# Patient Record
Sex: FEMALE | Race: WHITE | NOT HISPANIC OR LATINO | Employment: UNEMPLOYED | URBAN - METROPOLITAN AREA
[De-identification: names, ages, dates, MRNs, and addresses within clinical notes are randomized per-mention and may not be internally consistent; named-entity substitution may affect disease eponyms.]

---

## 2018-01-02 ENCOUNTER — OFFICE VISIT (OUTPATIENT)
Dept: MEDICAL GROUP | Facility: MEDICAL CENTER | Age: 68
End: 2018-01-02
Payer: MEDICARE

## 2018-01-02 VITALS
HEART RATE: 80 BPM | OXYGEN SATURATION: 94 % | SYSTOLIC BLOOD PRESSURE: 132 MMHG | WEIGHT: 154 LBS | RESPIRATION RATE: 16 BRPM | DIASTOLIC BLOOD PRESSURE: 80 MMHG | TEMPERATURE: 97.8 F | BODY MASS INDEX: 28.34 KG/M2 | HEIGHT: 62 IN

## 2018-01-02 DIAGNOSIS — E78.2 MIXED HYPERLIPIDEMIA: ICD-10-CM

## 2018-01-02 DIAGNOSIS — G47.9 SLEEP DISORDER: ICD-10-CM

## 2018-01-02 DIAGNOSIS — Z12.39 SCREENING FOR MALIGNANT NEOPLASM OF BREAST: ICD-10-CM

## 2018-01-02 DIAGNOSIS — Z23 NEED FOR VACCINATION: ICD-10-CM

## 2018-01-02 DIAGNOSIS — Z12.11 SCREEN FOR COLON CANCER: ICD-10-CM

## 2018-01-02 DIAGNOSIS — I10 ESSENTIAL HYPERTENSION: ICD-10-CM

## 2018-01-02 PROBLEM — Z98.84 HX OF LAPAROSCOPIC GASTRIC BANDING: Status: ACTIVE | Noted: 2018-01-02

## 2018-01-02 PROBLEM — F17.210 CIGARETTE NICOTINE DEPENDENCE WITHOUT COMPLICATION: Status: ACTIVE | Noted: 2018-01-02

## 2018-01-02 PROCEDURE — 99214 OFFICE O/P EST MOD 30 MIN: CPT | Performed by: NURSE PRACTITIONER

## 2018-01-02 RX ORDER — AMITRIPTYLINE HYDROCHLORIDE 150 MG/1
TABLET ORAL
Qty: 180 TAB | Refills: 3 | Status: SHIPPED | OUTPATIENT
Start: 2018-01-02 | End: 2018-09-26 | Stop reason: SDUPTHER

## 2018-01-02 RX ORDER — LOSARTAN POTASSIUM 100 MG/1
100 TABLET ORAL DAILY
Qty: 90 TAB | Refills: 3 | Status: SHIPPED | OUTPATIENT
Start: 2018-01-02 | End: 2018-09-26 | Stop reason: SDUPTHER

## 2018-01-02 ASSESSMENT — PATIENT HEALTH QUESTIONNAIRE - PHQ9: CLINICAL INTERPRETATION OF PHQ2 SCORE: 0

## 2018-01-02 NOTE — PROGRESS NOTES
CC: Medication refill    Vivian Worrell is a 67 y.o. female here to establish care and to discuss the evaluation and management of:    1. High blood pressure  Patient states that she has high blood pressure and she takes losartan for this on a daily basis. Does not take her blood pressure at home. Denies any dizziness, chest pain, headache, or leg swelling.    2. High cholesterol  Patient states that she does not take atorvastatin anymore for this and has not taken it for over one year. Does not remember when and why she came off that medication. States she's lost some weight. She states she doesn't eat that much anymore. Has not had her labs checked in over a year.    3. Insomnia  Patient states that she suffers from insomnia.Taking amitriptyline for years for this. States that she sometimes takes one to 2 tablets as needed for sleep. Patient states that she works at [employer] on the night shift so her sleep pattern is disrupted.    4. Dry skin under finger nails  Patient states that she is going to be following up with a dermatologist states that she's had some dry skin growing underneath her fingernails for quite some time now. States that she has tried ointments and lotion that are over-the-counter. States that her niece has given her some black pepper oil to try it and seems to be a little bit better. States that symptoms he can peel her skin back underneath her fingernails, especially on her right hand.    ROS:  Denies any Headache, Blurred Vision, Confusion Chest pain,  Shortness of breath,  Abdominal pain, Changes of bowel or bladder, Lower ext edema, Fevers, Nights sweats, Weight Changes, Focal weakness or numbness.  All other systems are negative.      Current Outpatient Prescriptions:   •  amitriptyline (ELAVIL) 150 MG Tab, May take one to two tablets at night as needed for sleep, Disp: 180 Tab, Rfl: 3  •  losartan (COZAAR) 100 MG Tab, Take 1 Tab by mouth every day., Disp: 90 Tab, Rfl: 3    Allergies  "  Allergen Reactions   • Morphine        Past Medical History:   Diagnosis Date   • Hypertension 2004   • Obesity    • Sleep disorder      Past Surgical History:   Procedure Laterality Date   • HYSTERECTOMY, VAGINAL  2006   • CYSTOCELE REPAIR  2006    bladder suspension   • GASTRIC BANDING LAPAROSCOPIC  2004   • CHOLECYSTECTOMY  1993   • ELBOW ORIF Left 199     Family History   Problem Relation Age of Onset   • Heart Disease Mother    • Heart Disease Brother 60   • Diabetes Maternal Grandmother    • Stroke Paternal Grandmother    • Cancer Neg Hx      Social History     Social History   • Marital status: Unknown     Spouse name: N/A   • Number of children: N/A   • Years of education: N/A     Occupational History   • [employer] -      Social History Main Topics   • Smoking status: Light Tobacco Smoker     Packs/day: 1.00     Years: 35.00     Last attempt to quit: 9/28/2015   • Smokeless tobacco: Never Used      Comment: started again about 3 weeks ago   • Alcohol use No   • Drug use: No   • Sexual activity: Not Currently      Comment: , not working     Other Topics Concern   • Not on file     Social History Narrative   • No narrative on file       Objective:     Vitals: /80   Pulse 80   Temp 36.6 °C (97.8 °F)   Resp 16   Ht 1.575 m (5' 2\")   Wt 69.9 kg (154 lb)   SpO2 94%   BMI 28.17 kg/m²      General: Alert, pleasant, NAD  HEENT:  Normocephalic.   Neck supple.  No thyromegaly or masses palpated. No cervical or supraclavicular lymphadenopathy.  Heart:  Regular rate and rhythm.  S1 and S2 normal.  No murmurs appreciated.  Respiratory:  Normal respiratory effort.  Clear to auscultation bilaterally.    Skin:  Warm, dry, no rashes, on right hand 1st and 2nd metacarpal underneath nails at the tip of the finger present with dry hypertrophic skin, no erythema, no signs of infection.  Musculoskeletal:  Gait is normal.  Moves all extremities well.  Extremities:   No leg edema.  Neurological: " No tremors, sensation grossly intact  Psych:  Affect/mood is normal, judgement is good, memory is intact, grooming is appropriate.      Assessment and Plan.   67 y.o. female to establish and discuss the following    1. Essential hypertension  Staple in clinic today 130/80. Denies any chest pain, shortness of breath, dizziness and her leg swelling. Continue taking losartan daily. Patient has reduced her weight over the last year, continue exercise and heart healthy diet.  - BASIC METABOLIC PANEL; Future  - losartan (COZAAR) 100 MG Tab; Take 1 Tab by mouth every day.  Dispense: 90 Tab; Refill: 3  - TSH WITH REFLEX TO FT4; Future    2. Mixed hyperlipidemia  Needs updated lipid panel as she has not been on statin medication for over one year.  - LIPID PROFILE; Future    3. Sleep disorder  Stable. Continue using amitriptyline cautioned against driving, drinking alcoholic beverages and long-term use of medication.  - amitriptyline (ELAVIL) 150 MG Tab; May take one to two tablets at night as needed for sleep  Dispense: 180 Tab; Refill: 3    4. Screening for malignant neoplasm of breast  Has not had a mammogram, denies any nipple discharge, swelling, redness in her breasts were lumps or any masses she's concerned about.  - MA-MAMMO SCREENING BILAT W/LESTER W/CAD; Future    5. Screen for colon cancer  Has not had a colonoscopy states that a fit test several years ago. No family history of colon cancer no changes in presentation of her bowels.  - OCCULT BLOOD FECES IMMUNOASSAY (FIT); Future    6. Need for vaccination  We don't have vaccine at this clinic patient is to attempt to receive vaccine at another unknown facility this week.        Health Maintenance: possible fungal infection underneath nail beds, patient states is going to see a dermatologist for this.    Return in about 3 months (around 4/2/2018) for Blood Pressure.          Roxann BOWERS

## 2018-09-15 ENCOUNTER — HOSPITAL ENCOUNTER (OUTPATIENT)
Facility: MEDICAL CENTER | Age: 68
End: 2018-09-15
Attending: NURSE PRACTITIONER
Payer: MEDICARE

## 2018-09-15 ENCOUNTER — OFFICE VISIT (OUTPATIENT)
Dept: URGENT CARE | Facility: PHYSICIAN GROUP | Age: 68
End: 2018-09-15
Payer: MEDICARE

## 2018-09-15 VITALS
WEIGHT: 164 LBS | SYSTOLIC BLOOD PRESSURE: 122 MMHG | BODY MASS INDEX: 30.18 KG/M2 | RESPIRATION RATE: 18 BRPM | HEIGHT: 62 IN | HEART RATE: 84 BPM | DIASTOLIC BLOOD PRESSURE: 78 MMHG | TEMPERATURE: 98.6 F | OXYGEN SATURATION: 97 %

## 2018-09-15 DIAGNOSIS — R30.0 DYSURIA: ICD-10-CM

## 2018-09-15 DIAGNOSIS — N30.01 ACUTE CYSTITIS WITH HEMATURIA: ICD-10-CM

## 2018-09-15 LAB
APPEARANCE UR: CLEAR
BILIRUB UR STRIP-MCNC: NEGATIVE MG/DL
COLOR UR AUTO: YELLOW
GLUCOSE UR STRIP.AUTO-MCNC: NEGATIVE MG/DL
KETONES UR STRIP.AUTO-MCNC: NEGATIVE MG/DL
LEUKOCYTE ESTERASE UR QL STRIP.AUTO: NORMAL
NITRITE UR QL STRIP.AUTO: NEGATIVE
PH UR STRIP.AUTO: 7 [PH] (ref 5–8)
PROT UR QL STRIP: NEGATIVE MG/DL
RBC UR QL AUTO: NORMAL
SP GR UR STRIP.AUTO: 1.01
UROBILINOGEN UR STRIP-MCNC: 0.2 MG/DL

## 2018-09-15 PROCEDURE — 87086 URINE CULTURE/COLONY COUNT: CPT

## 2018-09-15 PROCEDURE — 87186 SC STD MICRODIL/AGAR DIL: CPT

## 2018-09-15 PROCEDURE — 81002 URINALYSIS NONAUTO W/O SCOPE: CPT | Performed by: NURSE PRACTITIONER

## 2018-09-15 PROCEDURE — 99214 OFFICE O/P EST MOD 30 MIN: CPT | Performed by: NURSE PRACTITIONER

## 2018-09-15 PROCEDURE — 87077 CULTURE AEROBIC IDENTIFY: CPT

## 2018-09-15 RX ORDER — PHENAZOPYRIDINE HYDROCHLORIDE 200 MG/1
200 TABLET, FILM COATED ORAL 3 TIMES DAILY PRN
Qty: 6 TAB | Refills: 0 | Status: SHIPPED | OUTPATIENT
Start: 2018-09-15 | End: 2019-04-28

## 2018-09-15 RX ORDER — NITROFURANTOIN 25; 75 MG/1; MG/1
100 CAPSULE ORAL EVERY 12 HOURS
Qty: 10 CAP | Refills: 0 | Status: SHIPPED | OUTPATIENT
Start: 2018-09-15 | End: 2018-09-20

## 2018-09-15 ASSESSMENT — ENCOUNTER SYMPTOMS
FEVER: 0
SWEATS: 0
HEADACHES: 0
FLANK PAIN: 0
VOMITING: 0
NAUSEA: 0
ABDOMINAL PAIN: 0
DIARRHEA: 0
BACK PAIN: 0
CONSTIPATION: 0
CHILLS: 0

## 2018-09-15 ASSESSMENT — LIFESTYLE VARIABLES: SUBSTANCE_ABUSE: 0

## 2018-09-15 NOTE — PATIENT INSTRUCTIONS
Urinary Tract Infection, Adult  Introduction  A urinary tract infection (UTI) is an infection of any part of the urinary tract. The urinary tract includes the:  · Kidneys.  · Ureters.  · Bladder.  · Urethra.  These organs make, store, and get rid of pee (urine) in the body.  Follow these instructions at home:  · Take over-the-counter and prescription medicines only as told by your doctor.  · If you were prescribed an antibiotic medicine, take it as told by your doctor. Do not stop taking the antibiotic even if you start to feel better.  · Avoid the following drinks:  ¨ Alcohol.  ¨ Caffeine.  ¨ Tea.  ¨ Carbonated drinks.  · Drink enough fluid to keep your pee clear or pale yellow.  · Keep all follow-up visits as told by your doctor. This is important.  · Make sure to:  ¨ Empty your bladder often and completely. Do not to hold pee for long periods of time.  ¨ Empty your bladder before and after sex.  ¨ Wipe from front to back after a bowel movement if you are female. Use each tissue one time when you wipe.  Contact a doctor if:  · You have back pain.  · You have a fever.  · You feel sick to your stomach (nauseous).  · You throw up (vomit).  · Your symptoms do not get better after 3 days.  · Your symptoms go away and then come back.  Get help right away if:  · You have very bad back pain.  · You have very bad lower belly (abdominal) pain.  · You are throwing up and cannot keep down any medicines or water.  This information is not intended to replace advice given to you by your health care provider. Make sure you discuss any questions you have with your health care provider.  Document Released: 06/05/2009 Document Revised: 05/25/2017 Document Reviewed: 11/07/2016  © 2017 [employer]

## 2018-09-15 NOTE — PROGRESS NOTES
"Subjective:      Vivian Worrell is a 67 y.o. female who presents with Painful Urination (urgency, frequency, X last night )            Dysuria    This is a new problem. The current episode started yesterday. The problem occurs every urination. The problem has been gradually worsening. The quality of the pain is described as aching and burning. The pain is at a severity of 4/10. The pain is moderate. There has been no fever. She is not sexually active. There is no history of pyelonephritis. Associated symptoms include hesitancy. Pertinent negatives include no chills, discharge, flank pain, nausea, sweats or vomiting. She has tried increased fluids for the symptoms. The treatment provided mild relief. There is no history of catheterization, kidney stones, recurrent UTIs, a single kidney, urinary stasis or a urological procedure.       Review of Systems   Constitutional: Negative for chills and fever.   Gastrointestinal: Negative for abdominal pain, constipation, diarrhea, nausea and vomiting.   Genitourinary: Positive for hesitancy. Negative for flank pain.   Musculoskeletal: Negative for back pain.   Neurological: Negative for headaches.   Psychiatric/Behavioral: Negative for substance abuse.          Objective:     /78   Pulse 84   Temp 37 °C (98.6 °F)   Resp 18   Ht 1.575 m (5' 2\")   Wt 74.4 kg (164 lb)   SpO2 97%   BMI 30.00 kg/m²      Physical Exam   Constitutional: Vital signs are normal. She appears well-developed and well-nourished. She is cooperative.  Non-toxic appearance. She does not appear ill. No distress.   HENT:   Head: Normocephalic.   Cardiovascular: Normal rate and regular rhythm.    Pulmonary/Chest: Effort normal and breath sounds normal.   Abdominal: Soft. Normal appearance. She exhibits no distension. There is no tenderness. There is no rigidity, no rebound and no guarding.   Musculoskeletal:        Lumbar back: Normal.   Neurological: She is alert.   Skin: Skin is warm and dry. " She is not diaphoretic.   Nursing note and vitals reviewed.              Assessment/Plan:     1. Acute cystitis with hematuria    - Urine Culture; Future  - nitrofurantoin monohydr macro (MACROBID) 100 MG Cap; Take 1 Cap by mouth every 12 hours for 5 days.  Dispense: 10 Cap; Refill: 0  - phenazopyridine (PYRIDIUM) 200 MG Tab; Take 1 Tab by mouth 3 times a day as needed.  Dispense: 6 Tab; Refill: 0    2. Dysuria    - POCT Urinalysis  - Urine Culture; Future  - nitrofurantoin monohydr macro (MACROBID) 100 MG Cap; Take 1 Cap by mouth every 12 hours for 5 days.  Dispense: 10 Cap; Refill: 0  - phenazopyridine (PYRIDIUM) 200 MG Tab; Take 1 Tab by mouth 3 times a day as needed.  Dispense: 6 Tab; Refill: 0    Educated in proper administration of medication(s) ordered today including safety, possible SE, risks, benefits, rationale and alternatives to therapy.   Return to clinic or PCP 3-4 days if current symptoms are not resolving in a satisfactory manner or sooner if new or worsening symptoms occur.   Patient  advised  Diagnose and signs and symptoms which would warrant further evaluation and /or emergent evaluation.    Patient was in agreement with this treatment plan and seemed to understand without barriers.   Questions were encouraged and answered to patients satisfaction.   Aftercare instructions given to pt/ caregiver.   Keep well hydrated

## 2018-09-17 LAB
BACTERIA UR CULT: ABNORMAL
BACTERIA UR CULT: ABNORMAL
SIGNIFICANT IND 70042: ABNORMAL
SITE SITE: ABNORMAL
SOURCE SOURCE: ABNORMAL

## 2018-09-26 ENCOUNTER — OFFICE VISIT (OUTPATIENT)
Dept: MEDICAL GROUP | Facility: MEDICAL CENTER | Age: 68
End: 2018-09-26
Payer: MEDICARE

## 2018-09-26 VITALS
OXYGEN SATURATION: 96 % | RESPIRATION RATE: 14 BRPM | BODY MASS INDEX: 30.18 KG/M2 | TEMPERATURE: 97 F | SYSTOLIC BLOOD PRESSURE: 128 MMHG | WEIGHT: 164 LBS | DIASTOLIC BLOOD PRESSURE: 78 MMHG | HEIGHT: 62 IN | HEART RATE: 81 BPM

## 2018-09-26 DIAGNOSIS — Z98.84 HX OF LAPAROSCOPIC GASTRIC BANDING: ICD-10-CM

## 2018-09-26 DIAGNOSIS — Z23 NEED FOR VACCINATION: ICD-10-CM

## 2018-09-26 DIAGNOSIS — S06.0X0D CONCUSSION WITHOUT LOSS OF CONSCIOUSNESS, SUBSEQUENT ENCOUNTER: ICD-10-CM

## 2018-09-26 DIAGNOSIS — F17.210 CIGARETTE NICOTINE DEPENDENCE WITHOUT COMPLICATION: ICD-10-CM

## 2018-09-26 DIAGNOSIS — R53.83 OTHER FATIGUE: ICD-10-CM

## 2018-09-26 DIAGNOSIS — G47.00 INSOMNIA, UNSPECIFIED TYPE: ICD-10-CM

## 2018-09-26 DIAGNOSIS — I10 ESSENTIAL HYPERTENSION: ICD-10-CM

## 2018-09-26 DIAGNOSIS — K22.4 ESOPHAGEAL DYSMOTILITY: ICD-10-CM

## 2018-09-26 DIAGNOSIS — E78.2 MIXED HYPERLIPIDEMIA: ICD-10-CM

## 2018-09-26 DIAGNOSIS — L65.9 HAIR LOSS: ICD-10-CM

## 2018-09-26 PROCEDURE — 90670 PCV13 VACCINE IM: CPT | Performed by: NURSE PRACTITIONER

## 2018-09-26 PROCEDURE — G0008 ADMIN INFLUENZA VIRUS VAC: HCPCS | Performed by: NURSE PRACTITIONER

## 2018-09-26 PROCEDURE — 99214 OFFICE O/P EST MOD 30 MIN: CPT | Mod: 25 | Performed by: NURSE PRACTITIONER

## 2018-09-26 PROCEDURE — G0009 ADMIN PNEUMOCOCCAL VACCINE: HCPCS | Performed by: NURSE PRACTITIONER

## 2018-09-26 PROCEDURE — 90662 IIV NO PRSV INCREASED AG IM: CPT | Performed by: NURSE PRACTITIONER

## 2018-09-26 RX ORDER — LOSARTAN POTASSIUM 100 MG/1
100 TABLET ORAL DAILY
Qty: 90 TAB | Refills: 3 | Status: SHIPPED | OUTPATIENT
Start: 2018-09-26 | End: 2019-09-16 | Stop reason: SDUPTHER

## 2018-09-26 RX ORDER — AMITRIPTYLINE HYDROCHLORIDE 150 MG/1
TABLET ORAL
Qty: 180 TAB | Refills: 3 | Status: SHIPPED | OUTPATIENT
Start: 2018-09-26 | End: 2019-05-06

## 2018-09-26 NOTE — PROGRESS NOTES
"cc:  Follow up/labs/recent head injury      Subjective:     HPI:     Vivian Worrell is a 67 y.o. female here to discuss the evaluation and management of:    Concusison  Patient states that she recently had a concussion at work on September 2.  States that she was working and she had hit a corner while pushing a pallet and fell flat on her back.  She hit her head pretty hard.  States she went to [facility] and they did x-rays of her neck.  She was told that she had a concussion.      Insomnia  Works night shift 5 days a week. Takes amitriptyline 1-2 tablets at night as needed,     Cigarettes  Wants to quit smoking. Having about 4 cigarettes daily.    Hair loss   Taking hair, skin and nails vitamins. States it is helping     Fatigue  States has been feeling more fatigued lately. She does work 5 night shifts and this may be a contributing factor.     Hx of lap band and difficult swallowing  Patient states that she had a lap band procedure in 2014.  Patient states that for several years it is been hard to eat and sometimes get foods and even liquids down.  Patient states that sometimes she will spit up the food/liquid and states that it \"never goes down.\"  Denies any blood or coffee-ground emesis in her vomit /spit up.  Denies any bowel changes.  Denies any projectile vomiting. States it does not happen every time she eats.    Blood pressure  Has been taking her losartan.  Denies any chest pain, shortness of breath or dizziness    Patient makes note that she is concerned about getting dementia.  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  And all other " systems are negative. Hair loss, insomnia, fatigue        Current Outpatient Prescriptions:   •  amitriptyline (ELAVIL) 150 MG Tab, May take one to two tablets at night as needed for sleep, Disp: 180 Tab, Rfl: 3  •  losartan (COZAAR) 100 MG Tab, Take 1 Tab by mouth every day., Disp: 90 Tab, Rfl: 3  •  varenicline (CHANTIX STARTING MONTH PAK) 0.5 MG X 11 & 1 MG X 42 tablet, Take 0.5mg on days 1-3 then take 0.5mg twice daily on 4-7 then take 1mg twice daily thereafter., Disp: 56 Tab, Rfl: 3  •  cefdinir (OMNICEF) 300 MG Cap, Take 1 Cap by mouth every 12 hours for 5 days., Disp: 10 Cap, Rfl: 0  •  phenazopyridine (PYRIDIUM) 200 MG Tab, Take 1 Tab by mouth 3 times a day for 2 days., Disp: 6 Tab, Rfl: 0  •  phenazopyridine (PYRIDIUM) 200 MG Tab, Take 1 Tab by mouth 3 times a day as needed., Disp: 6 Tab, Rfl: 0    Allergies   Allergen Reactions   • Iodine Rash and Unspecified     Contrast iodine only   + LOC when it was given in ~1993   • Morphine        Past Medical History:   Diagnosis Date   • Hypertension 2004   • Obesity    • Sleep disorder      Past Surgical History:   Procedure Laterality Date   • HYSTERECTOMY, VAGINAL  2006   • CYSTOCELE REPAIR  2006    bladder suspension   • GASTRIC BANDING LAPAROSCOPIC  2004   • CHOLECYSTECTOMY  1993   • ELBOW ORIF Left 199     Family History   Problem Relation Age of Onset   • Heart Disease Mother    • Heart Disease Brother 60   • Diabetes Maternal Grandmother    • Stroke Paternal Grandmother    • Cancer Neg Hx      Social History     Social History   • Marital status: Unknown     Spouse name: N/A   • Number of children: N/A   • Years of education: N/A     Occupational History   • [employer] -      Social History Main Topics   • Smoking status: Light Tobacco Smoker     Packs/day: 1.00     Years: 35.00     Last attempt to quit: 9/28/2015   • Smokeless tobacco: Never Used      Comment: started again about 3 weeks ago   • Alcohol use No   • Drug use: No   • Sexual  "activity: Not Currently      Comment: , not working     Other Topics Concern   • Not on file     Social History Narrative   • No narrative on file       Objective:     Vitals: /78 (BP Location: Right arm, Patient Position: Sitting, BP Cuff Size: Adult)   Pulse 81   Temp 36.1 °C (97 °F) (Temporal)   Resp 14   Ht 1.575 m (5' 2\")   Wt 74.4 kg (164 lb)   SpO2 96%   BMI 30.00 kg/m²    General: Alert, pleasant, NAD  HEENT: Normocephalic.    Skin: Warm, dry, no rashes.  Musculoskeletal: Gait is normal.  Moves all extremities well.  Extremities: No leg edema. No discoloration  Neurological: No tremors, sensation grossly intact, gait is normal,   Psych:  Affect/mood is normal, judgement is good, memory is intact, grooming is appropriate.    Assessment/Plan:     Vivian was seen today for office visit.    Diagnoses and all orders for this visit:    Essential hypertension  Stable.  Blood pressure in clinic today 120/78.  Continuing to take losartan without any side effects.  Denies any chest pain, shortness of breath or dizziness.  -     losartan (COZAAR) 100 MG Tab; Take 1 Tab by mouth every day.    Concussion without loss of consciousness, subsequent encounter  [facility]related [facility].    Insomnia, unspecified type  Chronic.  States that sometimes she has to take 2 tablets at night for sleep.  -     amitriptyline (ELAVIL) 150 MG Tab; May take one to two tablets at night as needed for sleep    Mixed hyperlipidemia  Need labs. Has pending lipid panel from January.     Hx of laparoscopic gastric banding  Esophageal dysmotility  Has been having intermittent difficulty for the past few years when swallowing.  Denies any projectile vomiting however states that she does \"spit up.\"  Will start with GI referral for evaluation    -     REFERRAL TO GASTROENTEROLOGY    Other fatigue  -     FERRITIN; Future  -     CBC WITHOUT DIFFERENTIAL; Future  -     IRON/TOTAL IRON BIND; " Future    Hair loss  -     FERRITIN; Future  -     IRON/TOTAL IRON BIND; Future    Cigarette nicotine dependence without complication  Discussed with patient the importance of reducing tobacco consumption. Educated patient regarding the effects of tobacco use on cardiovascular system.  -     varenicline (CHANTIX STARTING MONTH BRANDON) 0.5 MG X 11 & 1 MG X 42 tablet; Take 0.5mg on days 1-3 then take 0.5mg twice daily on 4-7 then take 1mg twice daily thereafter.    Need for vaccination  -     Influenza Vaccine, High Dose (65+ Only)  -     Pneumococcal Conjugate Vaccine 13-Valent    Health care Maintenance:   Influenza vaccine: given today  Pneumonia vaccines: given today      Return in about 3 months (around 12/26/2018).    {I have placed the above orders and discussed them with an approved delegating provider.  The MA is performing the below orders under the direction of Dr. Leo BOWERS

## 2018-09-26 NOTE — PATIENT INSTRUCTIONS
Varenicline oral tablets  What is this medicine?  VARENICLINE (chris EN i kleen) is used to help people quit smoking. It can reduce the symptoms caused by stopping smoking. It is used with a patient support program recommended by your physician.  This medicine may be used for other purposes; ask your health care provider or pharmacist if you have questions.  COMMON BRAND NAME(S): Chantix  What should I tell my health care provider before I take this medicine?  They need to know if you have any of these conditions:  -bipolar disorder, depression, schizophrenia or other mental illness  -heart disease  -if you often drink alcohol  -kidney disease  -peripheral vascular disease  -seizures  -stroke  -suicidal thoughts, plans, or attempt; a previous suicide attempt by you or a family member  -an unusual or allergic reaction to varenicline, other medicines, foods, dyes, or preservatives  -pregnant or trying to get pregnant  -breast-feeding  How should I use this medicine?  Take this medicine by mouth after eating. Take with a full glass of water. Follow the directions on the prescription label. Take your doses at regular intervals. Do not take your medicine more often than directed.  There are 3 ways you can use this medicine to help you quit smoking; talk to your health care professional to decide which plan is right for you:  1) you can choose a quit date and start this medicine 1 week before the quit date, or,  2) you can start taking this medicine before you choose a quit date, and then pick a quit date between day 8 and 35 days of treatment, or,  3) if you are not sure that you are able or willing to quit smoking right away, start taking this medicine and slowly decrease the amount you smoke as directed by your health care professional with the goal of being cigarette-free by week 12 of treatment.  Stick to your plan; ask about support groups or other ways to help you remain cigarette-free. If you are motivated to quit  smoking and did not succeed during a previous attempt with this medicine for reasons other than side effects, or if you returned to smoking after this treatment, speak with your health care professional about whether another course of this medicine may be right for you.  A special MedGuide will be given to you by the pharmacist with each prescription and refill. Be sure to read this information carefully each time.  Talk to your pediatrician regarding the use of this medicine in children. This medicine is not approved for use in children.  Overdosage: If you think you have taken too much of this medicine contact a poison control center or emergency room at once.  NOTE: This medicine is only for you. Do not share this medicine with others.  What if I miss a dose?  If you miss a dose, take it as soon as you can. If it is almost time for your next dose, take only that dose. Do not take double or extra doses.  What may interact with this medicine?  -alcohol or any product that contains alcohol  -insulin  -other stop smoking aids  -theophylline  -warfarin  This list may not describe all possible interactions. Give your health care provider a list of all the medicines, herbs, non-prescription drugs, or dietary supplements you use. Also tell them if you smoke, drink alcohol, or use illegal drugs. Some items may interact with your medicine.  What should I watch for while using this medicine?  Visit your doctor or health care professional for regular check ups. Ask for ongoing advice and encouragement from your doctor or healthcare professional, friends, and family to help you quit. If you smoke while on this medication, quit again  Your mouth may get dry. Chewing sugarless gum or sucking hard candy, and drinking plenty of water may help. Contact your doctor if the problem does not go away or is severe.  You may get drowsy or dizzy. Do not drive, use machinery, or do anything that needs mental alertness until you know how  this medicine affects you. Do not stand or sit up quickly, especially if you are an older patient. This reduces the risk of dizzy or fainting spells.  Sleepwalking can happen during treatment with this medicine, and can sometimes lead to behavior that is harmful to you, other people, or property. Stop taking this medicine and tell your doctor if you start sleepwalking or have other unusual sleep-related activity.  Decrease the amount of alcoholic beverages that you drink during treatment with this medicine until you know if this medicine affects your ability to tolerate alcohol. Some people have experienced increased drunkenness (intoxication), unusual or sometimes aggressive behavior, or no memory of things that have happened (amnesia) during treatment with this medicine.  The use of this medicine may increase the chance of suicidal thoughts or actions. Pay special attention to how you are responding while on this medicine. Any worsening of mood, or thoughts of suicide or dying should be reported to your health care professional right away.  What side effects may I notice from receiving this medicine?  Side effects that you should report to your doctor or health care professional as soon as possible:  -allergic reactions like skin rash, itching or hives, swelling of the face, lips, tongue, or throat  -acting aggressive, being angry or violent, or acting on dangerous impulses  -breathing problems  -changes in vision  -chest pain or chest tightness  -confusion, trouble speaking or understanding  -new or worsening depression, anxiety, or panic attacks  -extreme increase in activity and talking (mikel)  -fast, irregular heartbeat  -feeling faint or lightheaded, falls  -fever  -pain in legs when walking  -problems with balance, talking, walking  -redness, blistering, peeling or loosening of the skin, including inside the mouth  -ringing in ears  -seeing or hearing things that aren't there  (hallucinations)  -seizures  -sleepwalking  -sudden numbness or weakness of the face, arm or leg  -thoughts about suicide or dying, or attempts to commit suicide  -trouble passing urine or change in the amount of urine  -unusual bleeding or bruising  -unusually weak or tired  Side effects that usually do not require medical attention (report to your doctor or health care professional if they continue or are bothersome):  -constipation  -headache  -nausea, vomiting  -strange dreams  -stomach gas  -trouble sleeping  This list may not describe all possible side effects. Call your doctor for medical advice about side effects. You may report side effects to FDA at 8-939-FDA-1766.  Where should I keep my medicine?  Keep out of the reach of children.  Store at room temperature between 15 and 30 degrees C (59 and 86 degrees F). Throw away any unused medicine after the expiration date.  NOTE: This sheet is a summary. It may not cover all possible information. If you have questions about this medicine, talk to your doctor, pharmacist, or health care provider.  © 2018 [employer]vier/Gold Standard (2016-09-01 16:14:23)

## 2018-09-26 NOTE — LETTER
[facility] hospital  [facility]  [address]  [place] [address]  Fax: [phone]   Authorization for Release/Disclosure of   Protected Health Information   Name: BETTIE CRUZ : 1950 SSN: [id number]   Address: [address] [place] [address] [address]  [place] [address] Phone:    [phone] (home)    I authorize the entity listed below to release/disclose the PHI below to:   Critical access hospital/[facility] and [facility]USTIN [facility]   Provider or Entity Name:  Ev Saleem   Address   City, [facility], [place], [place]  Phone:      Fax:     Reason for request: continuity of care   Information to be released:    [  ] LAST COLONOSCOPY,  including any PATH REPORT and follow-up  [  ] LAST FIT/COLOGUARD RESULT [  ] LAST DEXA  [  ] LAST MAMMOGRAM  [  ] LAST PAP  [  ] LAST LABS [  ] RETINA EXAM REPORT  [  ] IMMUNIZATION RECORDS  [X] Release all info: Claim# [id number], Incident 18       [  ] Check here and initial the line next to each item to release ALL health information INCLUDING  _____ Care and treatment for drug and / or alcohol abuse  _____ HIV testing, infection status, or AIDS  _____ Genetic Testing    DATES OF SERVICE OR TIME PERIOD TO BE DISCLOSED: _____________  I understand and acknowledge that:  * This Authorization may be revoked at any time by you in writing, except if your health information has already been used or disclosed.  * Your health information that will be used or disclosed as a result of you signing this authorization could be re-disclosed by the recipient. If this occurs, your re-disclosed health information may no longer be protected by State or Federal laws.  * You may refuse to sign this Authorization. Your refusal will not affect your ability to obtain treatment.  * This Authorization becomes effective upon signing and will  on (date) __________.      If no date is indicated, this Authorization will  one (1) year from the  signature date.    Name: Vivian Worrell    Signature:   Date:     9/26/2018       PLEASE FAX REQUESTED RECORDS BACK TO: [phone]

## 2018-09-30 ENCOUNTER — OFFICE VISIT (OUTPATIENT)
Dept: URGENT CARE | Facility: PHYSICIAN GROUP | Age: 68
End: 2018-09-30
Payer: MEDICARE

## 2018-09-30 VITALS
HEIGHT: 62 IN | DIASTOLIC BLOOD PRESSURE: 74 MMHG | HEART RATE: 100 BPM | WEIGHT: 166 LBS | BODY MASS INDEX: 30.55 KG/M2 | TEMPERATURE: 98.9 F | SYSTOLIC BLOOD PRESSURE: 122 MMHG | RESPIRATION RATE: 15 BRPM | OXYGEN SATURATION: 94 %

## 2018-09-30 DIAGNOSIS — N30.00 ACUTE CYSTITIS WITHOUT HEMATURIA: ICD-10-CM

## 2018-09-30 LAB
APPEARANCE UR: CLEAR
BILIRUB UR STRIP-MCNC: NEGATIVE MG/DL
COLOR UR AUTO: YELLOW
GLUCOSE UR STRIP.AUTO-MCNC: NORMAL MG/DL
KETONES UR STRIP.AUTO-MCNC: NEGATIVE MG/DL
LEUKOCYTE ESTERASE UR QL STRIP.AUTO: NORMAL
NITRITE UR QL STRIP.AUTO: POSITIVE
PH UR STRIP.AUTO: 6 [PH] (ref 5–8)
PROT UR QL STRIP: NORMAL MG/DL
RBC UR QL AUTO: NORMAL
SP GR UR STRIP.AUTO: 1.01
UROBILINOGEN UR STRIP-MCNC: 0.2 MG/DL

## 2018-09-30 PROCEDURE — 99214 OFFICE O/P EST MOD 30 MIN: CPT | Performed by: FAMILY MEDICINE

## 2018-09-30 PROCEDURE — 81002 URINALYSIS NONAUTO W/O SCOPE: CPT | Performed by: FAMILY MEDICINE

## 2018-09-30 RX ORDER — CEFDINIR 300 MG/1
300 CAPSULE ORAL EVERY 12 HOURS
Qty: 10 CAP | Refills: 0 | Status: SHIPPED | OUTPATIENT
Start: 2018-09-30 | End: 2018-10-05

## 2018-09-30 RX ORDER — PHENAZOPYRIDINE HYDROCHLORIDE 200 MG/1
200 TABLET, FILM COATED ORAL 3 TIMES DAILY
Qty: 6 TAB | Refills: 0 | Status: SHIPPED | OUTPATIENT
Start: 2018-09-30 | End: 2018-10-02

## 2018-09-30 ASSESSMENT — ENCOUNTER SYMPTOMS
EYE REDNESS: 0
WEIGHT LOSS: 0
EYE DISCHARGE: 0
MYALGIAS: 0

## 2018-09-30 NOTE — PROGRESS NOTES
"Subjective:      Vivian Worrell is a 67 y.o. female who presents with No chief complaint on file.            3 weeks urinary burning urgency and frequency. Seen her previously with rx for nitrofurantoin but started several days after.  No hematuria.  No fever.  No flank pain.  No past medical history of pyelonephritis.  No relief with OTC medications.  Symptoms are moderate and progressively worse.  No other aggravating or alleviating factors.        Review of Systems   Constitutional: Negative for malaise/fatigue and weight loss.   Eyes: Negative for discharge and redness.   Musculoskeletal: Negative for joint pain and myalgias.   Skin: Negative for itching and rash.     .  Medications, Allergies, and current problem list reviewed today in Epic       Objective:     /74 (BP Location: Right arm, Patient Position: Sitting, BP Cuff Size: Adult)   Pulse 100   Temp 37.2 °C (98.9 °F) (Temporal)   Resp 15   Ht 1.575 m (5' 2\")   Wt 75.3 kg (166 lb)   SpO2 94%   BMI 30.36 kg/m²      Physical Exam   Constitutional: She is oriented to person, place, and time. She appears well-developed and well-nourished. No distress.   HENT:   Head: Normocephalic and atraumatic.   Eyes: Conjunctivae are normal.   Cardiovascular: Normal rate, regular rhythm and normal heart sounds.    Pulmonary/Chest: Effort normal and breath sounds normal.   Genitourinary:   Genitourinary Comments: No CVA tenderness.  No suprapubic tenderness   Neurological: She is alert and oriented to person, place, and time.   Skin: Skin is warm and dry. No rash noted.               Assessment/Plan:     UA reviewed    1. Acute cystitis without hematuria  POCT Urinalysis    cefdinir (OMNICEF) 300 MG Cap    phenazopyridine (PYRIDIUM) 200 MG Tab     Differential diagnosis, natural history, supportive care, and indications for immediate follow-up discussed at length.     Push fluids    Urine culture 9/17 reviewed    "

## 2018-10-01 PROBLEM — S06.0X0A CONCUSSION WITH NO LOSS OF CONSCIOUSNESS: Status: ACTIVE | Noted: 2018-10-01

## 2018-10-01 PROBLEM — L65.9 HAIR LOSS: Status: ACTIVE | Noted: 2018-10-01

## 2018-10-01 PROBLEM — K22.4 ESOPHAGEAL DYSMOTILITY: Status: ACTIVE | Noted: 2018-10-01

## 2018-10-01 PROBLEM — R53.83 OTHER FATIGUE: Status: ACTIVE | Noted: 2018-10-01

## 2018-10-01 PROBLEM — R41.3 MEMORY CHANGES: Status: ACTIVE | Noted: 2018-10-01

## 2018-11-13 ENCOUNTER — OFFICE VISIT (OUTPATIENT)
Dept: URGENT CARE | Facility: PHYSICIAN GROUP | Age: 68
End: 2018-11-13
Payer: MEDICARE

## 2018-11-13 VITALS
BODY MASS INDEX: 29.81 KG/M2 | WEIGHT: 162 LBS | OXYGEN SATURATION: 93 % | SYSTOLIC BLOOD PRESSURE: 112 MMHG | HEART RATE: 76 BPM | RESPIRATION RATE: 16 BRPM | TEMPERATURE: 97.2 F | DIASTOLIC BLOOD PRESSURE: 80 MMHG | HEIGHT: 62 IN

## 2018-11-13 DIAGNOSIS — S61.011A LACERATION OF RIGHT THUMB WITHOUT FOREIGN BODY WITHOUT DAMAGE TO NAIL, INITIAL ENCOUNTER: ICD-10-CM

## 2018-11-13 DIAGNOSIS — L08.9 SOFT TISSUE INFECTION: ICD-10-CM

## 2018-11-13 PROCEDURE — 99214 OFFICE O/P EST MOD 30 MIN: CPT | Performed by: NURSE PRACTITIONER

## 2018-11-13 RX ORDER — CLINDAMYCIN HYDROCHLORIDE 300 MG/1
300 CAPSULE ORAL 3 TIMES DAILY
Qty: 21 CAP | Refills: 0 | Status: SHIPPED | OUTPATIENT
Start: 2018-11-13 | End: 2018-11-20

## 2018-11-13 ASSESSMENT — ENCOUNTER SYMPTOMS: ROS SKIN COMMENTS: LACERATION LEFT THUMB

## 2018-11-14 NOTE — PROGRESS NOTES
"Subjective:      Vivian Worrell is a 67 y.o. female who presents with Laceration            Laceration    Incident onset: pt reports she cut her left thumb over 2 weeks ago on a cardboard box. She states the cut is very slow healing, it is now tender to the touch. She denies any drainage from it. The laceration is located on the left hand. The laceration is 2 cm in size. She reports no foreign bodies (pt reports she was using super glue to keep the edges together and rinsing with hydrogen peroxide every day to clean it) present.       Review of Systems   Skin:        Laceration left thumb   All other systems reviewed and are negative.    Past Medical History:   Diagnosis Date   • Hypertension 2004   • Obesity    • Sleep disorder       Past Surgical History:   Procedure Laterality Date   • HYSTERECTOMY, VAGINAL  2006   • CYSTOCELE REPAIR  2006    bladder suspension   • GASTRIC BANDING LAPAROSCOPIC  2004   • CHOLECYSTECTOMY  1993   • ELBOW ORIF Left 199      Social History     Social History   • Marital status: Unknown     Spouse name: N/A   • Number of children: N/A   • Years of education: N/A     Occupational History   • [employer] -      Social History Main Topics   • Smoking status: Light Tobacco Smoker     Packs/day: 1.00     Years: 35.00     Last attempt to quit: 9/28/2015   • Smokeless tobacco: Never Used      Comment: started again about 3 weeks ago   • Alcohol use No   • Drug use: No   • Sexual activity: Not Currently      Comment: , not working     Other Topics Concern   • Not on file     Social History Narrative   • No narrative on file          Objective:     /80 (BP Location: Left arm, Patient Position: Sitting, BP Cuff Size: Adult)   Pulse 76   Temp 36.2 °C (97.2 °F) (Temporal)   Resp 16   Ht 1.575 m (5' 2.01\")   Wt 73.5 kg (162 lb)   SpO2 93%   BMI 29.62 kg/m²      Physical Exam   Constitutional: She is oriented to person, place, and time. Vital signs are normal. " She appears well-developed and well-nourished.   HENT:   Head: Normocephalic and atraumatic.   Eyes: Pupils are equal, round, and reactive to light. EOM are normal.   Neck: Normal range of motion.   Cardiovascular: Normal rate and regular rhythm.    Pulmonary/Chest: Effort normal.   Musculoskeletal: Normal range of motion.        Left hand: She exhibits tenderness, laceration and swelling.        Hands:  Neurological: She is alert and oriented to person, place, and time.   Skin: Skin is warm and dry. Capillary refill takes less than 2 seconds.   Psychiatric: She has a normal mood and affect. Her speech is normal and behavior is normal. Thought content normal.   Vitals reviewed.              Assessment/Plan:     1. Soft tissue infection  - clindamycin (CLEOCIN) 300 MG Cap; Take 1 Cap by mouth 3 times a day for 7 days.  Dispense: 21 Cap; Refill: 0    2. Laceration of right thumb without foreign body without damage to nail, initial encounter  - clindamycin (CLEOCIN) 300 MG Cap; Take 1 Cap by mouth 3 times a day for 7 days.  Dispense: 21 Cap; Refill: 0    Discussed with patient the wound is too old to try and repair with sutures at this time.  Approximated edges and applied steri strips to hold edges together  Instructed pt to apply polysporin ointment daily for 5 days to soften skin/tissue to encourage healthy growth of new tissue and hopefully soften super glue in order to remove it  Avoid excessive moisture to finger  Keep covered with bandage and free from any debris  Change dressing twice daily for one week  Continue to reapply steri strips until wound heals  Supportive care, differential diagnoses, and indications for immediate follow-up discussed with patient.    Pathogenesis of diagnosis discussed including typical length and natural progression.      Instructed to return to  or nearest emergency department if symptoms fail to improve, for any change in condition, further concerns, or new concerning  symptoms.  Patient states understanding of the plan of care and discharge instructions.

## 2019-04-28 ENCOUNTER — APPOINTMENT (OUTPATIENT)
Dept: RADIOLOGY | Facility: MEDICAL CENTER | Age: 69
End: 2019-04-28
Attending: EMERGENCY MEDICINE
Payer: MEDICARE

## 2019-04-28 ENCOUNTER — HOSPITAL ENCOUNTER (EMERGENCY)
Facility: MEDICAL CENTER | Age: 69
End: 2019-04-28
Attending: EMERGENCY MEDICINE
Payer: MEDICARE

## 2019-04-28 VITALS
HEIGHT: 61 IN | DIASTOLIC BLOOD PRESSURE: 93 MMHG | WEIGHT: 169.09 LBS | SYSTOLIC BLOOD PRESSURE: 123 MMHG | HEART RATE: 90 BPM | RESPIRATION RATE: 16 BRPM | TEMPERATURE: 97.1 F | BODY MASS INDEX: 31.93 KG/M2 | OXYGEN SATURATION: 95 %

## 2019-04-28 DIAGNOSIS — S69.92XA HAND INJURY, LEFT, INITIAL ENCOUNTER: ICD-10-CM

## 2019-04-28 PROCEDURE — A9270 NON-COVERED ITEM OR SERVICE: HCPCS | Performed by: EMERGENCY MEDICINE

## 2019-04-28 PROCEDURE — 73110 X-RAY EXAM OF WRIST: CPT | Mod: LT

## 2019-04-28 PROCEDURE — 700102 HCHG RX REV CODE 250 W/ 637 OVERRIDE(OP): Performed by: EMERGENCY MEDICINE

## 2019-04-28 PROCEDURE — 99283 EMERGENCY DEPT VISIT LOW MDM: CPT

## 2019-04-28 PROCEDURE — 73130 X-RAY EXAM OF HAND: CPT | Mod: LT

## 2019-04-28 RX ORDER — IBUPROFEN 200 MG
400 TABLET ORAL ONCE
Status: COMPLETED | OUTPATIENT
Start: 2019-04-28 | End: 2019-04-28

## 2019-04-28 RX ORDER — ACETAMINOPHEN 500 MG
1000 TABLET ORAL ONCE
Status: COMPLETED | OUTPATIENT
Start: 2019-04-28 | End: 2019-04-28

## 2019-04-28 RX ADMIN — ACETAMINOPHEN 1000 MG: 500 TABLET ORAL at 23:18

## 2019-04-28 RX ADMIN — IBUPROFEN 400 MG: 200 TABLET, FILM COATED ORAL at 23:18

## 2019-04-28 ASSESSMENT — LIFESTYLE VARIABLES: DO YOU DRINK ALCOHOL: NO

## 2019-04-29 NOTE — ED TRIAGE NOTES
"Vivian Worrell  68 y.o.    /93   Pulse 90   Temp 36.2 °C (97.1 °F) (Temporal)   Resp 16   Ht 1.549 m (5' 1\")   Wt 76.7 kg (169 lb 1.5 oz)   SpO2 95%   BMI 31.95 kg/m²     Chief Complaint   Patient presents with   • Hand Injury     Fell earlier this am from second stair. Pt has swelling and bruising to top of L hand. Pt has Full ROM in all fingers and wrist, cap refill <3sec. pt is able to manipulate purse and clothes with little difficulty, Pt states she did not elevated, ice or take OTC pain medications.      Pt amb to triage with steady gait for above complaint. Full ROM in wrist denies wrist pains, arm pains, denies other injuries, Telephone order placed for L hand xray per Dr Choi.  Pt is alert and oriented, speaking in full sentences, follows commands and responds appropriately to questions. NAD. Resp are even and unlabored.  Pt placed in lobby. Pt educated on triage process and encouraged to alert staff for any changes.     "

## 2019-04-29 NOTE — DISCHARGE INSTRUCTIONS
You are seen in the emergency department for pain and swelling in your hand.  Your x-rays did not show any evidence of fracture.  You most likely have bruising and soft tissue injury.  This will heal over time.  Please keep your hand elevated to reduce swelling and pain.  You may use ice for the first 48 hours to help reduce the swelling.    For pain you can take ibuprofen (Motrin), 600mg every 6 hours as needed for pain (take with food to avoid GI upset). You can also take acetaminophen (Tylenol), 1000mg every 8 hours as needed for pain. Do not take more than 3000mg of acetaminophen in any 24 hour period.  Taking these medications regularly during the day can be very effective in controlling pain.    Please follow-up with your regular doctor    Please return to the emergency department or seek medical attention if you develop:  Cold dusky fingers, loss of sensation of the hand, any other new or concerning findings

## 2019-04-29 NOTE — ED PROVIDER NOTES
ED Provider Note    Scribed for Riley Figueroa M.D. by Yina Draper. 4/28/2019,  10:58 PM.    Means of Arrival: walk-in  History obtained from: patient  History limited by: none    CHIEF COMPLAINT  Chief Complaint   Patient presents with   • Hand Injury     Fell earlier this am from second stair. Pt has swelling and bruising to top of L hand. Pt has Full ROM in all fingers and wrist, cap refill <3sec. pt is able to manipulate purse and clothes with little difficulty, Pt states she did not elevated, ice or take OTC pain medications.        HPI  Vivian Worrell is a 68 y.o. female who presents to the Emergency Department complaining of left hand pain with associated swelling onset today. The patient additionally endorses left elbow pain. She reports that prior to arrival she fell down from the second stair, but denies hitting her head, or any other injuries. She has not taken medication for alleviation. Patient denies numbness.       REVIEW OF SYSTEMS  MSK: positive left hand pain and swelling. Positive elbow pain  NEURO: negative for numbness     See HPI for further details.     PAST MEDICAL HISTORY  Past Medical History:   Diagnosis Date   • Hypertension 2004   • Obesity    • Sleep disorder        FAMILY HISTORY  Family History   Problem Relation Age of Onset   • Heart Disease Mother    • Heart Disease Brother 60   • Diabetes Maternal Grandmother    • Stroke Paternal Grandmother    • Cancer Neg Hx        SOCIAL HISTORY   reports that she has been smoking.  She has a 35.00 pack-year smoking history. She has never used smokeless tobacco. She reports that she does not drink alcohol or use drugs.    SURGICAL HISTORY  Past Surgical History:   Procedure Laterality Date   • HYSTERECTOMY, VAGINAL  2006   • CYSTOCELE REPAIR  2006    bladder suspension   • GASTRIC BANDING LAPAROSCOPIC  2004   • CHOLECYSTECTOMY  1993   • ELBOW ORIF Left 199       CURRENT MEDICATIONS  Home Medications     Reviewed by Shy Chauhan R.N.  "(Registered Nurse) on 04/28/19 at 2225  Med List Status: Complete   Medication Last Dose Status   amitriptyline (ELAVIL) 150 MG Tab 4/28/2019 Active   losartan (COZAAR) 100 MG Tab 4/28/2019 Active                ALLERGIES  Allergies   Allergen Reactions   • Iodine Rash and Unspecified     Contrast iodine only   + LOC when it was given in ~1993   • Morphine          PHYSICAL EXAM  VITAL SIGNS: /93   Pulse 90   Temp 36.2 °C (97.1 °F) (Temporal)   Resp 16   Ht 1.549 m (5' 1\")   Wt 76.7 kg (169 lb 1.5 oz)   SpO2 95%   BMI 31.95 kg/m²    Gen: alert, no acute distress  HENT: ATNC  Eyes: normal conjuctiva  Resp: No resipiratory distress.   CV: No JVD   Abd: Non-distended  Extremities: No deformity  MSK: swelling and tenderness to 4th and 5th metacarpal of left hand, good capillary refill, distal CMS intact, but slightly diminished in fingers 3 through 5. Able to flex and extend elbow, no bony deformities     DIAGNOSTIC STUDIES / PROCEDURES       RADIOLOGY  DX-WRIST-COMPLETE 3+ LEFT   Final Result      1.  No fracture or dislocation of LEFT wrist.   2.  Mild dorsal soft tissue swelling.   3.  Mild osteoarthritis.      DX-HAND 3+ LEFT   Final Result      1.  No fracture or dislocation of LEFT hand.   2.  Dorsal soft tissue swelling.        The radiologist’s interpretation of all radiology studies have been reviewed by me.    COURSE & MEDICAL DECISION MAKING  Pertinent Labs & Imaging studies reviewed. (See chart for details)    10:58 PM Patient seen and examined at bedside. Ordered for  imaging to evaluate. Patient will be treated with motrin 400 mg and tylenol 1000mg for her symptoms.         Medical Decision Making:  Patient presents after mechanical fall.  Remainder of exam demonstrates no other injuries.  No evidence of cervical or intracranial injuries.  Patient does have bruising and tenderness over the dorsum of the hand concerning for possible fracture, will obtain x-rays of this in the wrist.  No " evidence of fracture at the elbow.    X-rays negative on my read, awaiting radiologist read.    X-rays negative.  Patient was provided with anticipatory guidance, return precautions     The patient will return for new or worsening symptoms and is stable at the time of discharge.      DISPOSITION:  Patient will be discharged home in stable condition.    FOLLOW UP:  Roxann Mclaughlin A.P.R.NMelvi  [address] [address]  [phone]    In 1 week  As needed      OUTPATIENT MEDICATIONS:  New Prescriptions    No medications on file        FINAL IMPRESSION  1. Hand injury, left, initial encounter            Yina BENÍTEZ (Scribe), am scribing for, and in the presence of, Riley Figueroa M.D..    Electronically signed by: Yina Draper (Tamela), 4/28/2019    IRiley M.D. personally performed the services described in this documentation, as scribed by Yina Draper in my presence, and it is both accurate and complete.    E    The note accurately reflects work and decisions made by me.  Riley Figueroa  4/28/2019  11:52 PM

## 2019-05-06 ENCOUNTER — OFFICE VISIT (OUTPATIENT)
Dept: MEDICAL GROUP | Facility: MEDICAL CENTER | Age: 69
End: 2019-05-06
Payer: MEDICARE

## 2019-05-06 VITALS
RESPIRATION RATE: 16 BRPM | OXYGEN SATURATION: 90 % | BODY MASS INDEX: 31.65 KG/M2 | SYSTOLIC BLOOD PRESSURE: 120 MMHG | HEART RATE: 88 BPM | TEMPERATURE: 98.1 F | DIASTOLIC BLOOD PRESSURE: 82 MMHG | WEIGHT: 172 LBS | HEIGHT: 62 IN

## 2019-05-06 DIAGNOSIS — I10 ESSENTIAL HYPERTENSION: Chronic | ICD-10-CM

## 2019-05-06 DIAGNOSIS — E78.2 MIXED HYPERLIPIDEMIA: ICD-10-CM

## 2019-05-06 DIAGNOSIS — M19.042 PRIMARY OSTEOARTHRITIS OF LEFT HAND: ICD-10-CM

## 2019-05-06 DIAGNOSIS — E66.9 OBESITY (BMI 30-39.9): ICD-10-CM

## 2019-05-06 DIAGNOSIS — S69.92XD INJURY OF LEFT HAND, SUBSEQUENT ENCOUNTER: ICD-10-CM

## 2019-05-06 PROCEDURE — 99214 OFFICE O/P EST MOD 30 MIN: CPT | Performed by: NURSE PRACTITIONER

## 2019-05-06 RX ORDER — MELOXICAM 15 MG/1
1 TABLET ORAL DAILY
Refills: 1 | COMMUNITY
Start: 2019-04-30 | End: 2019-10-22

## 2019-05-06 ASSESSMENT — PATIENT HEALTH QUESTIONNAIRE - PHQ9: CLINICAL INTERPRETATION OF PHQ2 SCORE: 0

## 2019-05-06 NOTE — PROGRESS NOTES
cc:  Left hand pain from recent fall      Subjective:     HPI:     Vivian Worrell is a 68 y.o. female here to discuss the evaluation and management of:    1. Injury of left hand, subsequent encounter  Patient was recently seen in the emergency room for a injury to her left hand after subsequently falling.  Denies any head injury or loss of consciousness.  States that she tripped.  Reviewing imaging there was no other.  Patient states that swelling has gone down bruising has improved.  States she still somewhat has a decreased  and slight edema on the metacarpal area.  No numbness or tingling.  Was also reviewed that she did have some osteoarthritis found in her left hand.      2. Primary osteoarthritis of left hand  Found on recent imaging of her left hand after a fall.  Has been taking some meloxicam that was prescribed at a different time.    3. Essential hypertension  Tolerating the losartan 100 mg daily.  Denies any chest pain, shortness of breath or dizziness.      4. Mixed hyperlipidemia  In reviewing patient's labs.  She is due for lipid panel.  She is currently not taking any medication for this.    Moving to [place] in July with her sister.     ROS:  Denies any Headache, Blurred Vision, Confusion, Chest pain,  Shortness of breath,  Abdominal pain, Changes of bowel or bladder, Lower ext edema, Fevers, Nights sweats, Weight Changes, Focal weakness or numbness.  And all other systems are negative.left hand pain        Current Outpatient Prescriptions:   •  meloxicam (MOBIC) 15 MG tablet, Take 1 Tab by mouth every day., Disp: , Rfl: 1  •  amitriptyline (ELAVIL) 150 MG Tab, TAKE ONE TO TWO TABLETS BY MOUTH ONCE DAILY IN THE EVENING AS NEEDED FOR SLEEP, Disp: 180 Tab, Rfl: 1  •  losartan (COZAAR) 100 MG Tab, Take 1 Tab by mouth every day., Disp: 90 Tab, Rfl: 3    Allergies   Allergen Reactions   • Iodine Rash and Unspecified     Contrast iodine only   + LOC when it was given in ~1993   • Morphine   "      Past Medical History:   Diagnosis Date   • Hypertension 2004   • Obesity    • Sleep disorder      Past Surgical History:   Procedure Laterality Date   • HYSTERECTOMY, VAGINAL  2006   • CYSTOCELE REPAIR  2006    bladder suspension   • GASTRIC BANDING LAPAROSCOPIC  2004   • CHOLECYSTECTOMY  1993   • ELBOW ORIF Left 199     Family History   Problem Relation Age of Onset   • Heart Disease Mother    • Heart Disease Brother 60   • Diabetes Maternal Grandmother    • Stroke Paternal Grandmother    • Cancer Neg Hx      Social History     Social History   • Marital status: Unknown     Spouse name: N/A   • Number of children: N/A   • Years of education: N/A     Occupational History   • [employer]arehouse -      Social History Main Topics   • Smoking status: Light Tobacco Smoker     Packs/day: 1.00     Years: 35.00     Last attempt to quit: 9/28/2015   • Smokeless tobacco: Never Used      Comment: started again about 3 weeks ago   • Alcohol use No   • Drug use: No   • Sexual activity: Not Currently      Comment: , not working     Other Topics Concern   • Not on file     Social History Narrative   • No narrative on file       Objective:     Vitals: /82   Pulse 88   Temp 36.7 °C (98.1 °F)   Resp 16   Ht 1.575 m (5' 2\")   Wt 78 kg (172 lb)   SpO2 90%   BMI 31.46 kg/m²    General: Alert, pleasant, NAD  HEENT: Normocephalic.    Skin: Warm, dry, no rashes.  Extremities: No leg edema. No discoloration  Musculoskeletal: left hand decreased  strength, bruising, slight edema on metacarpal area  Neurological: No tremors  Psych:  Affect/mood is normal, judgement is good, memory is intact, grooming is appropriate.    Assessment/Plan:     Vivian was seen today for hospital follow-up.    Diagnoses and all orders for this visit:    1. Injury of left hand, subsequent encounter  Imaging on left hand from April 20, 2019: Mild dorsal soft tissue swelling, no fracture or dislocation, mild osteoarthritis " found.    Per patient report swelling and bruising have improved since her recent fall.  X-ray reveals no fracture or dislocation of her left hand.  She does still have some swelling on her metacarpals primarily third through the fifth.  Bruising appears to be resolving, decreased .  Have written patient a note for work.  Encouraged to rest, ice and elevation.    2. Primary osteoarthritis of left hand  Chronic.  Found incidentally on her most recent x-ray of her hand status post fall.  Encourage anti-inflammatories with food.    3. Essential hypertension  Stable.  Denies any chest pain, shortness of breath or dizziness.  Denies any leg swelling.  Continue taking losartan 100 mg daily at this time.  Labs ordered.  - CBC WITHOUT DIFFERENTIAL; Future  - Comp Metabolic Panel; Future  - MICROALBUMIN CREAT RATIO URINE; Future  - TSH WITH REFLEX TO FT4; Future    4. Mixed hyperlipidemia  Needs labs.  Currently not been any medications at this time.  Discussed with patient the importance of a heart healthy diet rich in fruits, vegetables, low-fat dairy products, whole grain, poultry, fish, legumes, nuts, and vegetable oil. Limit intake of red meat, sweets, sugar-containing beverages, trans-fats, sodium, and restrict intake of saturated fat to 5-6% of total daily calories.  - Lipid Profile; Future    5. Obesity (BMI 30-39.9)  Chronic. Patient encouraged to reduce excess calorie consumption. Encouraged regular exercise. Discussed long term sequelae of obesity.  - Patient identified as having weight management issue.  Appropriate orders and counseling given.      Return in about 7 weeks (around 6/24/2019).          Roxann BOWERS

## 2019-05-06 NOTE — LETTER
May 6, 2019       Patient: Vivian Worrell   YOB: 1950   Date of Visit: 5/6/2019         To Whom It May Concern:    It is my medical opinion that Vivian Worrell return to light duty on Thursday May 9th, 2019 with the following restrictions no heavy lifting, pushing or pulling anything over 10-15 pounds. This restriction will remain for 6 weeks.     If you have any questions or concerns, please don't hesitate to call [phone]          Sincerely,          LASHAWN Bailey.  Electronically Signed

## 2019-08-02 ENCOUNTER — HOSPITAL ENCOUNTER (EMERGENCY)
Facility: MEDICAL CENTER | Age: 69
End: 2019-08-02
Attending: EMERGENCY MEDICINE
Payer: MEDICARE

## 2019-08-02 ENCOUNTER — APPOINTMENT (OUTPATIENT)
Dept: RADIOLOGY | Facility: MEDICAL CENTER | Age: 69
End: 2019-08-02
Attending: EMERGENCY MEDICINE
Payer: MEDICARE

## 2019-08-02 VITALS
HEIGHT: 61 IN | OXYGEN SATURATION: 98 % | HEART RATE: 88 BPM | DIASTOLIC BLOOD PRESSURE: 80 MMHG | TEMPERATURE: 97.2 F | RESPIRATION RATE: 16 BRPM | SYSTOLIC BLOOD PRESSURE: 109 MMHG | BODY MASS INDEX: 32.3 KG/M2 | WEIGHT: 171.08 LBS

## 2019-08-02 DIAGNOSIS — M79.661 RIGHT CALF PAIN: ICD-10-CM

## 2019-08-02 PROCEDURE — 93971 EXTREMITY STUDY: CPT | Mod: RT

## 2019-08-02 PROCEDURE — 99283 EMERGENCY DEPT VISIT LOW MDM: CPT

## 2019-08-02 PROCEDURE — 304561 HCHG STAT O2

## 2019-08-02 NOTE — ED NOTES
US @ BS. Pt O2 sats dipped to 87% on RA while at rest. Pt sats improved to 93% while talking. Pt denies sob or cp. Pt placed on 2lnc.

## 2019-08-02 NOTE — ED TRIAGE NOTES
"PT ambulated to triage c/o rt leg pain x 3 days. PT was seen at  yesterday and was told to follow up today in the ER for R/O DVT.    Chief Complaint   Patient presents with   • Leg Pain     possible DVT per  yesterday     /84   Pulse (!) 101   Temp 36.2 °C (97.2 °F) (Temporal)   Resp 16   Ht 1.549 m (5' 1\")   Wt 77.6 kg (171 lb 1.2 oz)   SpO2 98%     "

## 2019-08-02 NOTE — ED PROVIDER NOTES
ED Provider Note    Scribed for Manuel Santiago M.D. by Knvg Chowdhury. 8/2/2019  10:50 AM    Primary care provider: ROBINSON Bailey  Means of arrival: walk in  History obtained from: patient  History limited by: none    CHIEF COMPLAINT  Chief Complaint   Patient presents with   • Leg Pain     possible DVT per UC yesterday       HPI  Vivian Worrell is a 68 y.o. female who presents to the Emergency Department complaining of persistent and gradually worsening right leg pain for the last 3 days. She localizes her pain to the medial and lateral aspects of the right calf. Patient reports that she was evaluated at urgent care yesterday and informed that she may have a DVT. She was referred to follow up in the ED today for ultrasound evaluation. Patient reports a history of hypertension. She denies chest pain, focal weakness, numbness.       REVIEW OF SYSTEMS  Pertinent positives include leg pain.   Pertinent negatives include no chest pain, focal weakness, numbness.    All other systems reviewed and negative. See HPI for further details.     PAST MEDICAL HISTORY   has a past medical history of Hypertension (2004), Obesity, and Sleep disorder.    SURGICAL HISTORY   has a past surgical history that includes gastric banding laparoscopic (2004); cholecystectomy (1993); hysterectomy, vaginal (2006); cystocele repair (2006); and elbow orif (Left, 199).    SOCIAL HISTORY  Social History     Tobacco Use   • Smoking status: Light Tobacco Smoker     Packs/day: 1.00     Years: 35.00     Pack years: 35.00     Last attempt to quit: 9/28/2015     Years since quitting: 3.8   • Smokeless tobacco: Never Used   • Tobacco comment: started again about 3 weeks ago   Substance Use Topics   • Alcohol use: No     Alcohol/week: 0.0 oz   • Drug use: No      Social History     Substance and Sexual Activity   Drug Use No       FAMILY HISTORY  Family History   Problem Relation Age of Onset   • Heart Disease Mother    • Heart  "Disease Brother 60   • Diabetes Maternal Grandmother    • Stroke Paternal Grandmother    • Cancer Neg Hx        CURRENT MEDICATIONS  Home Medications     Reviewed by Daly Webb R.N. (Registered Nurse) on 08/02/19 at 1016  Med List Status: Not Addressed   Medication Last Dose Status   amitriptyline (ELAVIL) 150 MG Tab  Active   losartan (COZAAR) 100 MG Tab  Active   meloxicam (MOBIC) 15 MG tablet  Active                ALLERGIES  Allergies   Allergen Reactions   • Iodine Rash and Unspecified     Contrast iodine only   + LOC when it was given in ~1993   • Morphine        PHYSICAL EXAM  VITAL SIGNS: /84   Pulse (!) 101   Temp 36.2 °C (97.2 °F) (Temporal)   Resp 16   Ht 1.549 m (5' 1\")   Wt 77.6 kg (171 lb 1.2 oz)   SpO2 98%   BMI 32.32 kg/m²     Nursing note and vitals reviewed.  Constitutional: Well-developed and well-nourished. No distress.   HENT: Head is normocephalic and atraumatic. Oropharynx is clear and moist without exudate or erythema.   Eyes: Pupils are equal, round, and reactive to light. Conjunctiva are normal.   Cardiovascular: Normal rate and regular rhythm. No murmur heard. Normal radial pulses.  Pulmonary/Chest: Breath sounds normal. No wheezes or rales.   Abdominal: Soft and non-tender. No distention    Musculoskeletal: Extremities exhibit normal range of motion. Mild tenderness of the right calf. Negative Homans' sign. No palpable cords. No swelling or asymmetry   Neurological: Awake, alert and oriented to person, place, and time. No focal deficits noted.  Skin: Skin is warm and dry. No rash.   Psychiatric: Normal mood and affect. Appropriate for clinical situation.    DIAGNOSTIC STUDIES / PROCEDURES    LABS    All labs reviewed by me.    RADIOLOGY  US-EXTREMITY VENOUS LOWER UNILAT RIGHT   Final Result        No DVT    The radiologist's interpretation of all radiological studies have been reviewed by me.    COURSE & MEDICAL DECISION MAKING  Nursing notes, VS, PMSFHx reviewed in " chart.     10:50 AM - Patient seen and examined at bedside. Ordered US extremity Venous to evaluate her symptoms. The differential diagnoses include but are not limited to: DVT, musculoskeletal pain, electrolyte disturbance, dehydration    Ultrasound does not demonstrate any evidence of deep venous thrombosis.  Patient likely has musculoskeletal calf pain.  The limb is warm and well-perfused.  No evidence of ischemia.  No evidence of infection.    The patient will return for new or worsening symptoms and is stable at the time of discharge.    The patient is referred to a primary physician for blood pressure management, diabetic screening, and for all other preventative health concerns.    DISPOSITION:  Patient will be discharged home in stable condition.    FOLLOW UP:  Roxann Mclaughlin A.P.R.NMelvi  [address]  [facility] [id number] [phone]    Schedule an appointment as soon as possible for a visit       [facility], Emergency Dept  [address] [id number] [phone]    If symptoms worsen      OUTPATIENT MEDICATIONS:  New Prescriptions    No medications on file         FINAL IMPRESSION  1. Right calf pain          Kvng BENÍTEZ (Tamela), am scribing for, and in the presence of, Manuel Santiago M.D..    Electronically signed by: Kvng Chowdhury (Tamela), 8/2/2019    Manuel BENÍTEZ M.D. personally performed the services described in this documentation, as scribed by Kvng Chowdhury in my presence, and it is both accurate and complete. C    The note accurately reflects work and decisions made by me.  Manuel Santiago  8/2/2019  12:08 PM

## 2019-08-09 ENCOUNTER — NON-PROVIDER VISIT (OUTPATIENT)
Dept: URGENT CARE | Facility: PHYSICIAN GROUP | Age: 69
End: 2019-08-09

## 2019-08-09 DIAGNOSIS — Z02.1 PRE-EMPLOYMENT DRUG SCREENING: ICD-10-CM

## 2019-08-09 LAB
AMP AMPHETAMINE: NORMAL
COC COCAINE: NORMAL
INT CON NEG: NORMAL
INT CON POS: NORMAL
MET METHAMPHETAMINES: NORMAL
OPI OPIATES: NORMAL
PCP PHENCYCLIDINE: NORMAL
POC DRUG COMMENT 753798-OCCUPATIONAL HEALTH: NORMAL
THC: NORMAL

## 2019-08-09 PROCEDURE — 80305 DRUG TEST PRSMV DIR OPT OBS: CPT | Performed by: NURSE PRACTITIONER

## 2019-09-17 ENCOUNTER — TELEPHONE (OUTPATIENT)
Dept: MEDICAL GROUP | Facility: MEDICAL CENTER | Age: 69
End: 2019-09-17

## 2019-09-17 NOTE — TELEPHONE ENCOUNTER
She can either pay cash and use the good Rx discount.  30 tablets is about $23. Or she can come in and we can discuss something else for her sleep

## 2019-09-17 NOTE — TELEPHONE ENCOUNTER
1. Caller Name: Vivian Worrell                                         Call Back Number: [phone] (home)       Patient approves a detailed voicemail message: N\A    Since Amitriptyline was denied, pt would like to know what to do now?

## 2019-10-15 ENCOUNTER — OFFICE VISIT (OUTPATIENT)
Dept: MEDICAL GROUP | Facility: MEDICAL CENTER | Age: 69
End: 2019-10-15
Payer: MEDICARE

## 2019-10-15 VITALS
DIASTOLIC BLOOD PRESSURE: 78 MMHG | SYSTOLIC BLOOD PRESSURE: 120 MMHG | BODY MASS INDEX: 30.36 KG/M2 | WEIGHT: 165 LBS | OXYGEN SATURATION: 92 % | RESPIRATION RATE: 16 BRPM | HEIGHT: 62 IN | HEART RATE: 95 BPM | TEMPERATURE: 98.1 F

## 2019-10-15 DIAGNOSIS — Z11.59 ENCOUNTER FOR HEPATITIS C SCREENING TEST FOR LOW RISK PATIENT: ICD-10-CM

## 2019-10-15 DIAGNOSIS — G47.9 SLEEP DISORDER: ICD-10-CM

## 2019-10-15 DIAGNOSIS — I10 ESSENTIAL HYPERTENSION: Chronic | ICD-10-CM

## 2019-10-15 DIAGNOSIS — Z78.0 POSTMENOPAUSAL: ICD-10-CM

## 2019-10-15 DIAGNOSIS — R68.3 CLUBBING OF NAILS: ICD-10-CM

## 2019-10-15 DIAGNOSIS — Z12.31 ENCOUNTER FOR SCREENING MAMMOGRAM FOR MALIGNANT NEOPLASM OF BREAST: ICD-10-CM

## 2019-10-15 PROCEDURE — 99214 OFFICE O/P EST MOD 30 MIN: CPT | Performed by: NURSE PRACTITIONER

## 2019-10-15 RX ORDER — AMITRIPTYLINE HYDROCHLORIDE 150 MG/1
150 TABLET ORAL NIGHTLY PRN
Qty: 90 TAB | Refills: 2 | Status: SHIPPED | OUTPATIENT
Start: 2019-10-15 | End: 2019-10-22

## 2019-10-15 RX ORDER — LOSARTAN POTASSIUM 100 MG/1
TABLET ORAL
Qty: 90 TAB | Refills: 2 | Status: SHIPPED | OUTPATIENT
Start: 2019-10-15 | End: 2019-10-28 | Stop reason: SDUPTHER

## 2019-10-15 RX ORDER — TRAZODONE HYDROCHLORIDE 50 MG/1
50 TABLET ORAL NIGHTLY PRN
Qty: 30 TAB | Refills: 1 | Status: SHIPPED | OUTPATIENT
Start: 2019-10-15 | End: 2019-10-28

## 2019-10-15 NOTE — PROGRESS NOTES
cc:  Follow up blood pressure/medication refills/nail changes      Subjective:     HPI:     Vivian Worrell is a 68 y.o. female here to discuss the evaluation and management of:      1. Essential hypertension  Taking losartan for this. No chest pain, shortness or breath, or leg swelling. Needs refill. Did not obtain previously ordered labs.    2. Postmenopausal  Due for bone scan.    3. Clubbing of nails  Has been present for >5 years. Quit smoking in April of this year.     4. Sleep disorder  Was taking Elavil 300mg nightly for this. Insurance not covering. Has not been taking.    5. Encounter for hepatitis C screening test for low risk patient  Due for screening.     6. Encounter for screening mammogram for malignant neoplasm of breast.  Due for screening.     ROS:  Denies any Headache, Blurred Vision, Confusion, Chest pain,  Shortness of breath,  Abdominal pain, Changes of bowel or bladder, Lower ext edema, Fevers, Nights sweats, Weight Changes, Focal weakness or numbness.  And all other systems are negative. Nail changes.         Current Outpatient Medications:   •  amitriptyline (ELAVIL) 150 MG Tab, Take 1 Tab by mouth at bedtime as needed for Sleep., Disp: 90 Tab, Rfl: 2  •  losartan (COZAAR) 100 MG Tab, TAKE 1 TABLET BY MOUTH ONCE DAILY, Disp: 90 Tab, Rfl: 2  •  traZODone (DESYREL) 50 MG Tab, Take 1 Tab by mouth at bedtime as needed for Sleep., Disp: 30 Tab, Rfl: 1  •  meloxicam (MOBIC) 15 MG tablet, Take 1 Tab by mouth every day., Disp: , Rfl: 1    Allergies   Allergen Reactions   • Iodine Rash and Unspecified     Contrast iodine only   + LOC when it was given in ~1993   • Morphine        Past Medical History:   Diagnosis Date   • Hypertension 2004   • Obesity    • Sleep disorder      Past Surgical History:   Procedure Laterality Date   • HYSTERECTOMY, VAGINAL  2006   • CYSTOCELE REPAIR  2006    bladder suspension   • GASTRIC BANDING LAPAROSCOPIC  2004   • CHOLECYSTECTOMY  1993   • ELBOW ORIF Left 199  "    Family History   Problem Relation Age of Onset   • Heart Disease Mother    • Heart Disease Brother 60   • Diabetes Maternal Grandmother    • Stroke Paternal Grandmother    • Cancer Neg Hx      Social History     Socioeconomic History   • Marital status: Unknown     Spouse name: Not on file   • Number of children: Not on file   • Years of education: Not on file   • Highest education level: Not on file   Occupational History   • Occupation: [employer]arehouse -   Social Needs   • Financial resource strain: Not on file   • Food insecurity:     Worry: Not on file     Inability: Not on file   • Transportation needs:     Medical: Not on file     Non-medical: Not on file   Tobacco Use   • Smoking status: Former Smoker     Packs/day: 1.00     Years: 35.00     Pack years: 35.00     Types: Cigarettes     Last attempt to quit: 2019     Years since quittin.5   • Smokeless tobacco: Never Used   Substance and Sexual Activity   • Alcohol use: No     Alcohol/week: 0.0 oz   • Drug use: No   • Sexual activity: Not Currently     Comment: , not working   Lifestyle   • Physical activity:     Days per week: Not on file     Minutes per session: Not on file   • Stress: Not on file   Relationships   • Social connections:     Talks on phone: Not on file     Gets together: Not on file     Attends Sabianist service: Not on file     Active member of club or organization: Not on file     Attends meetings of clubs or organizations: Not on file     Relationship status: Not on file   • Intimate partner violence:     Fear of current or ex partner: Not on file     Emotionally abused: Not on file     Physically abused: Not on file     Forced sexual activity: Not on file   Other Topics Concern   • Not on file   Social History Narrative   • Not on file       Objective:     Vitals: /78   Pulse 95   Temp 36.7 °C (98.1 °F)   Resp 16   Ht 1.575 m (5' 2\")   Wt 74.8 kg (165 lb)   SpO2 92%   BMI 30.18 kg/m²    General: " Alert, pleasant, NAD  HEENT: Normocephalic.    Skin: Warm, dry, no rashes.  Extremities: No leg edema. No discoloration  Neurological: No tremors  Psych:  Affect/mood is normal, judgement is good, memory is intact, grooming is appropriate.    Assessment/Plan:     Diagnoses and all orders for this visit:    Essential hypertension  Stable. Did not obtain previous labs. No chest pain, shortness of breath or dizziness. Needs labs.  -     losartan (COZAAR) 100 MG Tab; TAKE 1 TABLET BY MOUTH ONCE DAILY    Postmenopausal  Recommend 1200mg calcium and 800IU of Vitamin D.  -     DS-BONE DENSITY STUDY (DEXA); Future    Clubbing of nails  Likely due to long history of smoking. Recently quit 6 months ago. ?PFT's next visit. No respiratory complaints.  -     CBC WITH DIFFERENTIAL; Future    Sleep disorder  Will not continue amitriptyline due to high dose. Will change to trazodone.        traZODone (DESYREL) 50 MG Tab; Take 1 Tab by mouth at bedtime as needed for Sleep    Encounter for hepatitis C screening test for low risk patient  -     HEP C VIRUS ANTIBODY; Future    Encounter for screening mammogram for malignant neoplasm of breast   -     MA-SCREENING MAMMO BILAT W/TOMOSYNTHESIS W/CAD; Future      No follow-ups on file.        Roxann HARDIN.

## 2019-10-20 PROBLEM — R53.83 OTHER FATIGUE: Status: RESOLVED | Noted: 2018-10-01 | Resolved: 2019-10-20

## 2019-10-20 PROBLEM — S06.0X0A CONCUSSION WITH NO LOSS OF CONSCIOUSNESS: Status: RESOLVED | Noted: 2018-10-01 | Resolved: 2019-10-20

## 2019-10-22 ENCOUNTER — HOSPITAL ENCOUNTER (EMERGENCY)
Facility: MEDICAL CENTER | Age: 69
End: 2019-10-22
Attending: EMERGENCY MEDICINE
Payer: MEDICARE

## 2019-10-22 VITALS
BODY MASS INDEX: 29.72 KG/M2 | DIASTOLIC BLOOD PRESSURE: 88 MMHG | WEIGHT: 162.48 LBS | OXYGEN SATURATION: 97 % | RESPIRATION RATE: 18 BRPM | TEMPERATURE: 97.2 F | SYSTOLIC BLOOD PRESSURE: 136 MMHG | HEART RATE: 87 BPM

## 2019-10-22 DIAGNOSIS — Z76.0 MEDICATION REFILL: ICD-10-CM

## 2019-10-22 DIAGNOSIS — L03.314 CELLULITIS OF GROIN: ICD-10-CM

## 2019-10-22 DIAGNOSIS — L94.0 SCLERODERMA, LOCALIZED: ICD-10-CM

## 2019-10-22 DIAGNOSIS — B35.1 ONYCHOMYCOSIS: ICD-10-CM

## 2019-10-22 PROCEDURE — A9270 NON-COVERED ITEM OR SERVICE: HCPCS | Performed by: EMERGENCY MEDICINE

## 2019-10-22 PROCEDURE — 700102 HCHG RX REV CODE 250 W/ 637 OVERRIDE(OP): Performed by: EMERGENCY MEDICINE

## 2019-10-22 PROCEDURE — 99284 EMERGENCY DEPT VISIT MOD MDM: CPT

## 2019-10-22 RX ORDER — AMITRIPTYLINE HYDROCHLORIDE 150 MG/1
150 TABLET ORAL NIGHTLY PRN
Qty: 90 TAB | Refills: 0 | Status: SHIPPED | OUTPATIENT
Start: 2019-10-22 | End: 2019-10-28 | Stop reason: SDUPTHER

## 2019-10-22 RX ORDER — SULFAMETHOXAZOLE AND TRIMETHOPRIM 800; 160 MG/1; MG/1
1 TABLET ORAL EVERY 12 HOURS
Qty: 14 TAB | Refills: 0 | Status: SHIPPED | OUTPATIENT
Start: 2019-10-22 | End: 2019-10-29

## 2019-10-22 RX ORDER — SULFAMETHOXAZOLE AND TRIMETHOPRIM 800; 160 MG/1; MG/1
1 TABLET ORAL ONCE
Status: COMPLETED | OUTPATIENT
Start: 2019-10-22 | End: 2019-10-22

## 2019-10-22 RX ORDER — KETOCONAZOLE 20 MG/G
1 CREAM TOPICAL 2 TIMES DAILY
Qty: 1 TUBE | Refills: 0 | Status: SHIPPED | OUTPATIENT
Start: 2019-10-22

## 2019-10-22 RX ORDER — AMITRIPTYLINE HYDROCHLORIDE 150 MG/1
150 TABLET ORAL NIGHTLY PRN
Qty: 90 TAB | Refills: 0 | Status: SHIPPED | OUTPATIENT
Start: 2019-10-22 | End: 2019-10-22 | Stop reason: SDUPTHER

## 2019-10-22 RX ADMIN — SULFAMETHOXAZOLE AND TRIMETHOPRIM 1 TABLET: 800; 160 TABLET ORAL at 10:43

## 2019-10-22 NOTE — PROGRESS NOTES
Patient returned call and states that [facility] in [facility] does not have the amitriptyline in stock. She was able to call around and find it at the [facility] on [address].  I have sent in the amitriptyline rx to the [facility] on [address].     Blanca Kapadia, PharmD

## 2019-10-22 NOTE — ED PROVIDER NOTES
ED Provider Note    CHIEF COMPLAINT  Chief Complaint   Patient presents with   • Rash     right groin area x2days     Seen at 10:12 AM    HPI  Vivian Worrell is a 68 y.o. female who presents who presents with a painful rash in her right inguinal region for the past 3 days.  She denies any new lotions or detergents.  She has never had this rash in the past.  She believes it is spreading slightly.  She denies any fevers, chills, abdominal pain, nausea or vomiting.  She would also like to have me look at her great toenails as they are thickened and cause some pain with foot wear.  Lastly, the patient is moving to [place] and she is normally on amitriptyline 150 mg nightly.  She saw her primary care provider for a refill prior to leaving and was told that it is a precancerous medication and was subsequently switched to trazodone, she does not get adequate sleep with this medication.  Lastly, she notes some dry cracking skin in her hands that is been present for years and never fully heals.  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      REVIEW OF SYSTEMS  See HPI  PAST MEDICAL HISTORY   has a past medical history of Hypertension (), Obesity, and Sleep disorder.    SOCIAL HISTORY  Social History     Tobacco Use   • Smoking status: Former Smoker     Packs/day: 1.00     Years: 35.00     Pack years: 35.00     Types: Cigarettes     Last attempt to quit: 2019     Years since quittin.5   • Smokeless tobacco: Never Used   Substance and Sexual Activity   • Alcohol use: No     Alcohol/week: 0.0 oz   • Drug use: No   • Sexual activity: Not Currently     Comment: , not working       SURGICAL HISTORY   has a past surgical history that includes gastric banding laparoscopic (); cholecystectomy (); hysterectomy, vaginal (); cystocele repair (); and elbow orif (Left, 199).    CURRENT MEDICATIONS  Reviewed.  See Encounter Summary.      ALLERGIES  Allergies   Allergen Reactions   • Iodine Rash and Unspecified     Contrast iodine only   + LOC when it was given in ~1993   • Morphine        PHYSICAL EXAM  VITAL SIGNS: /88   Pulse 87   Temp 36.2 °C (97.2 °F) (Temporal)   Resp 18   Wt 73.7 kg (162 lb 7.7 oz)   SpO2 97%   BMI 29.72 kg/m²   Constitutional: Awake, alert in no apparent distress.  Pleasant.  Well-appearing.  HENT: Normocephalic, Bilateral external ears normal. Nose normal.   Eyes: Conjunctiva normal, non-icteric, EOMI.    Thorax & Lungs: Easy unlabored respirations  Cardiovascular:    Abdomen:  No distention  Skin: Right inguinal region: Slight increase in warmth in the intertriginous region with blanching erythema measuring about 5 x 6 cm, it does spare the deep folds of the skin.  No satellite lesions.  No abscesses or fluctuance.  No pustules.    Bilateral great toes have mild thickening of the nails consistent with onychomycosis.  The patient also has some dry skin on the hands and spooning of the fingernails similar to scleroderma.  Extremities:   atraumatic   Neurologic: Alert, Grossly non-focal.   Psychiatric: Affect and Mood normal    RADIOLOGY  No orders to display       Nursing notes and vital signs were reviewed. (See chart for details)    Decision Making:  This is a 68 y.o. year old female who presents with a rash in the right inguinal region.  It has the appearance of cellulitis.  The patient does have some tenderness to the touch which is most suggestive of a bacterial infection.  I do not suspect Adrian's gangrene.  She is a good outpatient candidate.  She was given a dose of Bactrim and will be discharged on the same.  I counseled her that she should note improvement after 48 hours of treatment.  If she notes worsening symptoms, fevers or abdominal pain she needs to return for repeat evaluation.  I addressed several other issues today.  She does have onychomycosis, this can be follow-up with a primary care  physician, explained that the treatment for this is lengthy and last at least 6 months.  She has the appearance of scleroderma on her hands causing some very dry skin and spooning of the fingers.  I recommend Vaseline to use nightly to alleviate the dry skin.    Lastly, the patient was on amitriptyline for years until her NP discontinued it in place her on trazodone.  I see no contraindication to this medication as she has been stable on it for a number of years.  For this reason I will refill the medication and allow her to transition to a new primary care provider, she plans on moving to [place] to be with her son in the next few weeks, the prescription should last her until she finds adequate follow-up.    The patient's blood pressure is elevated today. >120/80. I have referred them to primary care for follow up.       DISPOSITION:  Patient will be discharged home in good condition.    Discharge Medications:  Discharge Medication List as of 10/22/2019 10:37 AM      START taking these medications    Details   amitriptyline (ELAVIL) 150 MG Tab Take 1 Tab by mouth at bedtime as needed., Disp-90 Tab, R-0, Normal      sulfamethoxazole-trimethoprim (BACTRIM DS) 800-160 MG tablet Take 1 Tab by mouth every 12 hours for 7 days., Disp-14 Tab, R-0, Normal      ketoconazole (NIZORAL) 2 % Cream Apply 1 Application to affected area(s) 2 times a day., Disp-1 Tube, R-0, Normal             The patient was discharged home (see d/c instructions) and told to return immediately for any signs or symptoms listed, or any worsening at all.  The patient verbally agreed to the discharge precautions and follow-up plan which is documented in EPIC.          FINAL IMPRESSION  1. Cellulitis of groin    2. Onychomycosis    3. Medication refill    4. Scleroderma, localized

## 2019-10-22 NOTE — DISCHARGE PLANNING
note:  Received a call from pt asking CM to call [facility] because she needs a prior auth for Elavil.     CM called pharmacist oRbert at [facility] [phone], he confirmed that prior auth is needed so CM asked him to fax PA paper work to [phone]. He agreed.

## 2019-10-22 NOTE — DISCHARGE PLANNING
note:  Received a note from pt requesting for prior auth for Elavil.  CM called [facility] at [address], talked to Robert. He confirmed that prior auth is needed for Elavil.     Dr. Dion Garber confirmed that pt has insomnia diagnosis. Pt confirmed that she has been taking this medication for years. Talked to pharmacist at [facility].     Called [employer] prior auth department. [phone]. Prior auth has been received and to be faxed to . Ref # [id number]. Wili at [facility] aware. Pt aware and she was grateful.     Prior auth fax received from [employer]. Scanned to chart.

## 2019-10-22 NOTE — ED NOTES
.Pt verbalizes understanding of dc instructions. Rx given. Pt states all questions have been answered and they feel comfortable with dc instructions provided. Pt states has all personal belonging. Pt to lobby w/o incident

## 2019-10-22 NOTE — ED TRIAGE NOTES
Chief Complaint   Patient presents with   • Rash     right groin area x2days     Pt ambulated to triage , c/o rash right groin spreading to her abdomen.

## 2019-10-26 ENCOUNTER — HOSPITAL ENCOUNTER (EMERGENCY)
Facility: MEDICAL CENTER | Age: 69
End: 2019-10-26
Attending: EMERGENCY MEDICINE
Payer: MEDICARE

## 2019-10-26 VITALS
WEIGHT: 162.92 LBS | BODY MASS INDEX: 30.76 KG/M2 | HEIGHT: 61 IN | DIASTOLIC BLOOD PRESSURE: 79 MMHG | HEART RATE: 84 BPM | RESPIRATION RATE: 16 BRPM | TEMPERATURE: 97.6 F | SYSTOLIC BLOOD PRESSURE: 142 MMHG | OXYGEN SATURATION: 98 %

## 2019-10-26 DIAGNOSIS — L03.113 CELLULITIS OF RIGHT UPPER EXTREMITY: ICD-10-CM

## 2019-10-26 PROCEDURE — A9270 NON-COVERED ITEM OR SERVICE: HCPCS | Performed by: EMERGENCY MEDICINE

## 2019-10-26 PROCEDURE — 99284 EMERGENCY DEPT VISIT MOD MDM: CPT

## 2019-10-26 PROCEDURE — 700102 HCHG RX REV CODE 250 W/ 637 OVERRIDE(OP): Performed by: EMERGENCY MEDICINE

## 2019-10-26 RX ORDER — AMOXICILLIN AND CLAVULANATE POTASSIUM 875; 125 MG/1; MG/1
1 TABLET, FILM COATED ORAL ONCE
Status: COMPLETED | OUTPATIENT
Start: 2019-10-26 | End: 2019-10-26

## 2019-10-26 RX ORDER — AMOXICILLIN AND CLAVULANATE POTASSIUM 875; 125 MG/1; MG/1
1 TABLET, FILM COATED ORAL 2 TIMES DAILY
Qty: 14 TAB | Refills: 0 | Status: SHIPPED | OUTPATIENT
Start: 2019-10-26 | End: 2019-11-02

## 2019-10-26 RX ADMIN — AMOXICILLIN AND CLAVULANATE POTASSIUM 1 TABLET: 875; 125 TABLET, FILM COATED ORAL at 06:09

## 2019-10-26 NOTE — ED TRIAGE NOTES
"Vivian Lizbeth Hernandezkhadars   68 y.o. female   Chief Complaint   Patient presents with   • Hand Swelling     left hand for 2-3 days, swollen over the knuckles, red and tender to touch. has cats at home that scratch her, says she has had this before in the past      Pt amb to triage with steady gait for above complaint.      Pt is alert and oriented, speaking in full sentences, follows commands and responds appropriately to questions. NAD. Resp are even and unlabored.   Pt placed in lobby. Pt educated on triage process. Pt encouraged to alert staff for any changes.    /86   Pulse 89   Temp 36 °C (96.8 °F) (Temporal)   Resp 18   Ht 1.549 m (5' 1\")   Wt 73.9 kg (162 lb 14.7 oz)   SpO2 97%   BMI 30.78 kg/m²     "

## 2019-10-26 NOTE — ED NOTES
Patient verbalized understanding of discharge instructions, provided with discharge paperwork, gait steady, ambulated independently to JONY ellis.

## 2019-10-26 NOTE — ED PROVIDER NOTES
"ED Provider Note      CHIEF COMPLAINT   Chief Complaint   Patient presents with   • Hand Swelling     left hand for 2-3 days, swollen over the knuckles, red and tender to touch. has cats at home that scratch her, says she has had this before in the past       HPI   Vivian Worrell is a 68 y.o. female who presents to the emergency department with left hand swelling.  Patient was bit and scratched on the left hand by 1 of her cats 3 days ago.  Started getting red 2 days ago and is worse today.  No fevers or chills.  Mild throbbing pain worse with movement.    Patient was recently diagnosed with intertriginous cellulitis.  She was placed on Bactrim which she has been taking once a day rather than the prescribed twice a day.  She reports that she has been getting better.    Chart reviewed noting tetanus shot in 2016.    REVIEW OF SYSTEMS   Pertinent negative: As above.    PAST MEDICAL HISTORY   Past Medical History:   Diagnosis Date   • Hypertension    • Obesity    • Sleep disorder        SOCIAL HISTORY  Social History     Tobacco Use   • Smoking status: Former Smoker     Packs/day: 1.00     Years: 35.00     Pack years: 35.00     Types: Cigarettes     Last attempt to quit: 2019     Years since quittin.5   • Smokeless tobacco: Never Used   Substance Use Topics   • Alcohol use: No     Alcohol/week: 0.0 oz   • Drug use: No       ALLERGIES   See chart    PHYSICAL EXAM  VITAL SIGNS: /86   Pulse 89   Temp 36 °C (96.8 °F) (Temporal)   Resp 18   Ht 1.549 m (5' 1\")   Wt 73.9 kg (162 lb 14.7 oz)   SpO2 97%   BMI 30.78 kg/m²   Head: Atraumatic  Eyes: Eyes normal inspection  Neck: has full range of motion, normal inspection.  Constitutional: No acute distress   Cardiovascular: Normal heart rate. Pulses strong radial  Thorax & Lungs: No respiratory distress  Skin: 2 separate small scratch or puncture wound dorsal left hand.  Redness warmth over the radial dorsal left hand that tracks to about the " wrist.  No tracking lymphangitis or more proximal cellulitis  Musculoskeletal: Motion of the metacarpal joints and wrist.  Compartments soft.  Neurologic:  Normal sensory and motor      COURSE & MEDICAL DECISION MAKING  Patient presents with cellulitis of the left hand secondary to cat bite.  Her vital signs are normal.  No fever.  No suggestion of foreign body.  She is nontoxic.  Appropriate for outpatient management.  Given first dose of Augmentin in the ER and a prescription for Augmentin.  She will complete her course of Bactrim as previously prescribed.  She will return to the ER for worsening, not improving, high fevers or concerns.  Recheck with primary in 3 days.    FINAL IMPRESSION  1.  Left hand cellulitis secondary to cat bite      This dictation was created using voice recognition software. The accuracy of the dictation is limited to the abilities of the software. I expect there may be some errors of grammar and possibly content. The nursing notes were reviewed and certain aspects of this information were incorporated into this note.      Electronically signed by: Yao Dewey, 10/26/2019 6:04 AM

## 2019-10-28 ENCOUNTER — OFFICE VISIT (OUTPATIENT)
Dept: MEDICAL GROUP | Facility: PHYSICIAN GROUP | Age: 69
End: 2019-10-28
Payer: MEDICARE

## 2019-10-28 VITALS
HEART RATE: 87 BPM | BODY MASS INDEX: 30.91 KG/M2 | OXYGEN SATURATION: 96 % | HEIGHT: 62 IN | DIASTOLIC BLOOD PRESSURE: 78 MMHG | SYSTOLIC BLOOD PRESSURE: 124 MMHG | WEIGHT: 168 LBS | TEMPERATURE: 98.1 F

## 2019-10-28 DIAGNOSIS — K55.20 ANGIODYSPLASIA OF COLON: ICD-10-CM

## 2019-10-28 DIAGNOSIS — Z23 NEED FOR VACCINATION: ICD-10-CM

## 2019-10-28 DIAGNOSIS — L85.3 DRY SKIN: ICD-10-CM

## 2019-10-28 DIAGNOSIS — I10 ESSENTIAL HYPERTENSION: Chronic | ICD-10-CM

## 2019-10-28 DIAGNOSIS — I35.8 AORTIC SYSTOLIC MURMUR ON EXAMINATION: ICD-10-CM

## 2019-10-28 DIAGNOSIS — F17.210 CIGARETTE NICOTINE DEPENDENCE WITHOUT COMPLICATION: ICD-10-CM

## 2019-10-28 DIAGNOSIS — R68.3 CLUBBING OF FINGERS: ICD-10-CM

## 2019-10-28 DIAGNOSIS — F33.41 RECURRENT MAJOR DEPRESSIVE DISORDER, IN PARTIAL REMISSION (HCC): ICD-10-CM

## 2019-10-28 DIAGNOSIS — E78.2 MIXED HYPERLIPIDEMIA: Chronic | ICD-10-CM

## 2019-10-28 DIAGNOSIS — E66.9 OBESITY (BMI 30-39.9): ICD-10-CM

## 2019-10-28 DIAGNOSIS — G47.9 SLEEP DISORDER: Chronic | ICD-10-CM

## 2019-10-28 DIAGNOSIS — K57.90 DIVERTICULOSIS: ICD-10-CM

## 2019-10-28 PROBLEM — K22.4 ESOPHAGEAL DYSMOTILITY: Status: RESOLVED | Noted: 2018-10-01 | Resolved: 2019-10-28

## 2019-10-28 PROBLEM — R41.3 MEMORY CHANGES: Status: RESOLVED | Noted: 2018-10-01 | Resolved: 2019-10-28

## 2019-10-28 PROBLEM — K63.5 COLON POLYPS: Status: ACTIVE | Noted: 2019-10-28

## 2019-10-28 PROBLEM — M19.042 PRIMARY OSTEOARTHRITIS OF LEFT HAND: Chronic | Status: RESOLVED | Noted: 2019-05-06 | Resolved: 2019-10-28

## 2019-10-28 PROBLEM — F33.9 RECURRENT MAJOR DEPRESSIVE DISORDER (HCC): Status: ACTIVE | Noted: 2019-10-28

## 2019-10-28 PROBLEM — L65.9 HAIR LOSS: Status: RESOLVED | Noted: 2018-10-01 | Resolved: 2019-10-28

## 2019-10-28 LAB — EKG 4674: NORMAL

## 2019-10-28 PROCEDURE — G0008 ADMIN INFLUENZA VIRUS VAC: HCPCS | Performed by: INTERNAL MEDICINE

## 2019-10-28 PROCEDURE — 99204 OFFICE O/P NEW MOD 45 MIN: CPT | Mod: 25 | Performed by: INTERNAL MEDICINE

## 2019-10-28 PROCEDURE — 90732 PPSV23 VACC 2 YRS+ SUBQ/IM: CPT | Performed by: INTERNAL MEDICINE

## 2019-10-28 PROCEDURE — G0009 ADMIN PNEUMOCOCCAL VACCINE: HCPCS | Performed by: INTERNAL MEDICINE

## 2019-10-28 PROCEDURE — 90662 IIV NO PRSV INCREASED AG IM: CPT | Performed by: INTERNAL MEDICINE

## 2019-10-28 PROCEDURE — 93000 ELECTROCARDIOGRAM COMPLETE: CPT | Performed by: INTERNAL MEDICINE

## 2019-10-28 RX ORDER — LOSARTAN POTASSIUM 100 MG/1
TABLET ORAL
Qty: 90 TAB | Refills: 1 | Status: SHIPPED | OUTPATIENT
Start: 2019-10-28 | End: 2020-05-18

## 2019-10-28 RX ORDER — AMITRIPTYLINE HYDROCHLORIDE 150 MG/1
150 TABLET ORAL NIGHTLY PRN
Qty: 90 TAB | Refills: 1 | Status: SHIPPED | OUTPATIENT
Start: 2019-10-28 | End: 2020-05-18

## 2019-10-28 NOTE — PROGRESS NOTES
New Patient with complete physical exam    Chief Complaint   Patient presents with   • Hypertension   • Establish Care       Subjective:     History of Present Illness: Patient is a 68 y.o. female who is here today to establish with PCP, discuss chronic issues      Clubbing of fingers  Patient has clubbing of fingers for years, patient is a chronic heavy smoker (one pack per day for over 30 years).  Patient has some dry skin around the fingers as well as thickening and hyperkeratinization.  Patient denies any skin allergies.  Patient otherwise denies history of lung disease, cardiac disease beside murmur, history of cancers.  Patient is up-to-date with her colonoscopy and she is due January 2022.  Patient refused to have a lung cancer screening.  Otherwise denies lung symptoms, cardiac symptoms, GI symptoms or apparent GI bleeding.    Patient had colonoscopy in January 2019 by Dr. Gilliam, it seemed the media, with finding of angiodysplasia of the descending colon, mild diverticulosis of the sigmoid colon, as well as multiple polyps ranging from 2 mm to 11 mm in her ascending colon, sigmoid colon, and proximal rectum.    Sleep disorder   Recurrent major depressive disorder, in partial remission (HCC)  Reported history of chronic depression, most likely related to the stress from the work (she works at [employer] 6 days a week).  Reported on amitriptyline 150 mg daily for 15 to 20 years.  Reported stable on this dose without having any side effect.  Patient refuses to be on other alternative such as SSRI, trazodone or even bupropion which is a good choice especially helping out quitting smoking.  Does not feel suicidal or homicidal.      Essential hypertension  Chronic, denies symptoms, on daily losartan 100 mg.  Denies side effect.      Cigarette nicotine dependence without complication  1 pack/day for 30 to 35 years, she stopped smoking for 8 weeks recently, relapsed again.  Most likely secondary to stress.    Obesity  (BMI 30-39.9)  Secondary to previous habit of sugar containing beverage.  Patient currently trying to eat healthier, quit soda as well as exercise.  - Comp Metabolic Panel; Future      Current Outpatient Medications on File Prior to Visit   Medication Sig Dispense Refill   • amoxicillin-clavulanate (AUGMENTIN) 875-125 MG Tab Take 1 Tab by mouth 2 times a day for 7 days. 14 Tab 0   • sulfamethoxazole-trimethoprim (BACTRIM DS) 800-160 MG tablet Take 1 Tab by mouth every 12 hours for 7 days. 14 Tab 0   • ketoconazole (NIZORAL) 2 % Cream Apply 1 Application to affected area(s) 2 times a day. 1 Tube 0     No current facility-administered medications on file prior to visit.      Allergies   Allergen Reactions   • Iodine Rash and Unspecified     Contrast iodine only   + LOC when it was given in ~1993   • Morphine      Patient Active Problem List    Diagnosis Date Noted   • Clubbing of fingers 10/28/2019   • Dry skin 10/28/2019   • Recurrent major depressive disorder (HCC) 10/28/2019   • Aortic systolic murmur on examination 10/28/2019   • Obesity (BMI 30-39.9) 05/06/2019   • Hx of laparoscopic gastric banding 01/02/2018   • Cigarette nicotine dependence without complication 01/02/2018   • Essential hypertension 10/19/2015   • Hyperlipidemia 10/19/2015   • Sleep disorder      Past Medical History:   Diagnosis Date   • Esophageal dysmotility 10/1/2018   • Hair loss 10/1/2018   • Hypertension 2004   • Memory changes 10/1/2018   • Obesity    • Primary osteoarthritis of left hand 5/6/2019   • Sleep disorder      Past Surgical History:   Procedure Laterality Date   • HYSTERECTOMY, VAGINAL  2006   • CYSTOCELE REPAIR  2006    bladder suspension   • GASTRIC BANDING LAPAROSCOPIC  2004   • CHOLECYSTECTOMY  1993   • ELBOW ORIF Left 199     Family History   Problem Relation Age of Onset   • Heart Disease Mother    • Heart Disease Brother 60   • Diabetes Maternal Grandmother    • Stroke Paternal Grandmother    • Cancer Neg Hx   "    Social History     Tobacco Use   • Smoking status: Current Every Day Smoker     Packs/day: 1.00     Years: 35.00     Pack years: 35.00     Types: Cigarettes     Last attempt to quit: 2019     Years since quittin.5   • Smokeless tobacco: Never Used   Substance Use Topics   • Alcohol use: No     Alcohol/week: 0.0 oz   • Drug use: No         ROS: As per HPI, otherwise below:    - Constitutional: Negative for fever, chills, and fatigue/generalized weakness.     - HEENT: Negative for headaches, vision changes, hearing changes, ear pain, ear discharge, sinus congestion, sore throat, and neck pain.      - Respiratory: Negative for cough, sputum production, dyspnea and wheezing.    - Cardiovascular: Negative for chest pain, palpitations, orthopnea, and bilateral lower extremity edema.     - Gastrointestinal: Negative for heartburn, nausea, vomiting, abdominal pain, hematochezia, melena, diarrhea, constipation, and greasy/foul-smelling stools.     - Genitourinary: Negative for dysuria, polyuria, hematuria, pyuria, urinary urgency, and urinary incontinence.    - Musculoskeletal: Negative for myalgias, back pain, and joint pain.     - Skin: Negative for rash, itching, cyanotic skin color change.     - Neurological: Negative for dizziness, tingling, tremors, focal sensory deficit, focal weakness and headaches.     - Endo/Heme/Allergies: Does not bruise/bleed easily.       - NOTE: All other systems reviewed and are negative, except as in HPI.       Physical Exam:     /78 (BP Location: Right arm, Patient Position: Sitting, BP Cuff Size: Adult)   Pulse 87   Temp 36.7 °C (98.1 °F) (Temporal)   Ht 1.575 m (5' 2\")   Wt 76.2 kg (168 lb)   SpO2 96%   BMI 30.73 kg/m²   General: Normal appearing. No distress.  HEENT: Normocephalic. Eyes conjunctiva clear lids without ptosis, pupils equal and reactive to light accommodation, ears normal shape and contour, oropharynx is without erythema, edema or exudates.    Neck: " Supple without JVD. Thyroid is not enlarged.  Pulmonary: Clear to ausculation.  Normal effort. No rales, ronchi, or wheezing.  Cardiovascular: Regular rate and rhythm. Has aortic stenosis murmur with radiation to the neck. Radial pulses are intact, regular and symmetrical bilaterally.  Abdomen: Soft, nontender, nondistended. Normal bowel sounds. Liver and spleen are not palpable.  Neurologic: Grossly non-focal.  Lymph: No cervical, occipital or supraclavicular lymph nodes are palpable  Skin: Clubbing of the fingers, with a skin dry and hyperkeratinization.  Musculoskeletal: Normal gait. No extremity cyanosis, clubbing, or edema.  Psych: Alert and oriented x3. Judgment and insight is normal.    Note: I have reviewed all pertinent labs and diagnostic tests associated with this visit with specific comments listed under the assessment and plan below      Assessment and Plan:     1. Clubbing of fingers  2. Dry skin  Most likely related to the smoking habit.  Patient had colonoscopy January 2019 with multiple polyps removal, although we do not have a pathology result.  Advised to sign to obtain records from the GI specialist.  Otherwise patient denied alarm GI signs.  Patient refused to have lung cancer screening secondary to smoking behavior.  Advised to use over-the-counter emollients for her skin dry.  - REFERRAL TO DERMATOLOGY  - TSH WITH REFLEX TO FT4; Future  - DX-CHEST-2 VIEWS; Future      3. Sleep disorder  4. Recurrent major depressive disorder, in partial remission (HCC)  - amitriptyline (ELAVIL) 150 MG Tab; Take 1 Tab by mouth at bedtime as needed for Sleep.  Dispense: 90 Tab; Refill: 1  -Patient refused to take alternative medication with safer side effects such as SSRI, SNRI, trazodone or bupropion which is a better choice in regard to her smoking habit.    5. Essential hypertension  Chronic, stable  - TSH WITH REFLEX TO FT4; Future  - losartan (COZAAR) 100 MG Tab; TAKE 1 TABLET BY MOUTH ONCE DAILY  Dispense:  90 Tab; Refill: 1  - Lipid Profile; Future  - CBC WITH DIFFERENTIAL; Future  - Comp Metabolic Panel; Future    6. Mixed hyperlipidemia  - Lipid Profile; Future    7. Cigarette nicotine dependence without complication  Smoking cessation counseling:   A) Patient and I discussed various methods for smoking cessation including pharmacological supports (Chantix vs. Zyban, Nicotine replacement therapy), smoking cessation groups, smoking hotlines. Discussion of triggers to smoke and reasons to quit.   B) We discussed that number of lifetime quit attempts vary, but that probability of success increases when combining methods (pharmacologic + behavioral modification).     C) patient prefers to quit pressure on for now.  Will monitor    8. Obesity (BMI 30-39.9)  -encourage eating healthy food, exercise regularly and avoid unhealthy food   - Discussed weight loss goal. Recommended portion control, watching carbs  -advised to join overweight anonymous, use [employer] smartphone suma as well as watch weight watcher program to help losing Wt.  -declined Nutrition services as well as referral to weight loss program.    9. Need for vaccination  - PneumoVax PPV23 =>3yo  - INFLUENZA VACCINE, HIGH DOSE (65+ ONLY)  -Advised to obtain Shingrix from the pharmacy.    10. Aortic systolic murmur on examination  EKG:  EKG: NSR, normal axis and intervals, left atrial enlargement, no acute or chronic ischemic changes. No ST changes   - EC-ECHOCARDIOGRAM COMPLETE W/ CONT; Future    Other screenings:  Patient would like to defer mammogram screening as well as bone scan to next visit January 2020.  Patient declined to have a lung cancer screening for now      Follow Up:      No follow-ups on file.    Please note that this dictation was created using voice recognition software. I have made every reasonable attempt to correct obvious errors, but I expect that there are errors of grammar and possibly content that I did not discover before  finalizing the note.    Signed by: Emir Field M.D.

## 2019-10-29 ENCOUNTER — TELEPHONE (OUTPATIENT)
Dept: MEDICAL GROUP | Facility: PHYSICIAN GROUP | Age: 69
End: 2019-10-29

## 2019-10-29 NOTE — TELEPHONE ENCOUNTER
1. Caller Name: Vivian                                           Call Back Number: [phone] (home)         Patient approves a detailed voicemail message: yes    Pt called asking about getting a letter for work accommodations. Pt stated that she needs a letter for work stating that she can not lift boxes above her head. 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 Can this letter be given to pt? Please advise.

## 2019-11-01 ENCOUNTER — TELEPHONE (OUTPATIENT)
Dept: MEDICAL GROUP | Facility: PHYSICIAN GROUP | Age: 69
End: 2019-11-01

## 2020-05-15 DIAGNOSIS — I10 ESSENTIAL HYPERTENSION: Chronic | ICD-10-CM

## 2020-05-15 DIAGNOSIS — F33.41 RECURRENT MAJOR DEPRESSIVE DISORDER, IN PARTIAL REMISSION (HCC): ICD-10-CM

## 2020-05-18 RX ORDER — LOSARTAN POTASSIUM 100 MG/1
TABLET ORAL
Qty: 90 TAB | Refills: 1 | Status: SHIPPED | OUTPATIENT
Start: 2020-05-18

## 2020-05-18 RX ORDER — AMITRIPTYLINE HYDROCHLORIDE 150 MG/1
TABLET ORAL
Qty: 90 TAB | Refills: 1 | Status: SHIPPED | OUTPATIENT
Start: 2020-05-18

## 2021-03-03 DIAGNOSIS — Z23 NEED FOR VACCINATION: ICD-10-CM

## 2021-10-11 NOTE — TELEPHONE ENCOUNTER
Refill X 6 months, sent to pharmacy.Pt. Seen in the last 6 months per protocol.   Lab Results   Component Value Date/Time    SODIUM 138 08/11/2016 10:21 AM    POTASSIUM 4.2 08/11/2016 10:21 AM    CHLORIDE 105 08/11/2016 10:21 AM    CO2 25 08/11/2016 10:21 AM    GLUCOSE 99 08/11/2016 10:21 AM    BUN 10 08/11/2016 10:21 AM    CREATININE 0.90 08/11/2016 10:21 AM       
Was the patient seen in the last year in this department? Yes    Does patient have an active prescription for medications requested? No     Received Request Via: Pharmacy      Pt met protocol?: Yes, ov 10/19 bp 124/78     
Yes

## 2023-01-01 NOTE — TELEPHONE ENCOUNTER
----- Message from Emir Field M.D. sent at 10/28/2019  5:14 PM PDT -----  Regarding: CXR  Called patient and left voicemail to also have a chest x-ray done.  Please call patient back again and let her know that she has chest x-ray order to be done.  Thank you  
Phone Number Called: [phone] (home)     Call outcomeLeft pt detailed vm.  
1

## 2023-06-23 ENCOUNTER — TELEPHONE (OUTPATIENT)
Dept: HEALTH INFORMATION MANAGEMENT | Facility: OTHER | Age: 73
End: 2023-06-23
Payer: COMMERCIAL

## 2023-07-26 ENCOUNTER — TELEPHONE (OUTPATIENT)
Dept: HEALTH INFORMATION MANAGEMENT | Facility: OTHER | Age: 73
End: 2023-07-26
Payer: COMMERCIAL